# Patient Record
Sex: MALE | Race: WHITE | NOT HISPANIC OR LATINO | Employment: OTHER | URBAN - METROPOLITAN AREA
[De-identification: names, ages, dates, MRNs, and addresses within clinical notes are randomized per-mention and may not be internally consistent; named-entity substitution may affect disease eponyms.]

---

## 2017-10-10 ENCOUNTER — HISTORICAL (OUTPATIENT)
Dept: ADMINISTRATIVE | Facility: HOSPITAL | Age: 67
End: 2017-10-10

## 2017-10-17 ENCOUNTER — HISTORICAL (OUTPATIENT)
Dept: LAB | Facility: HOSPITAL | Age: 67
End: 2017-10-17

## 2017-10-19 LAB — FINAL CULTURE: NORMAL

## 2017-11-22 ENCOUNTER — HISTORICAL (OUTPATIENT)
Dept: PHYSICAL THERAPY | Facility: HOSPITAL | Age: 67
End: 2017-11-22

## 2017-11-27 ENCOUNTER — HISTORICAL (OUTPATIENT)
Dept: INFUSION THERAPY | Facility: HOSPITAL | Age: 67
End: 2017-11-27

## 2017-11-29 ENCOUNTER — HISTORICAL (OUTPATIENT)
Dept: PHYSICAL THERAPY | Facility: HOSPITAL | Age: 67
End: 2017-11-29

## 2017-11-30 ENCOUNTER — HISTORICAL (OUTPATIENT)
Dept: ADMINISTRATIVE | Facility: HOSPITAL | Age: 67
End: 2017-11-30

## 2017-12-01 ENCOUNTER — HISTORICAL (OUTPATIENT)
Dept: PHYSICAL THERAPY | Facility: HOSPITAL | Age: 67
End: 2017-12-01

## 2017-12-04 ENCOUNTER — HISTORICAL (OUTPATIENT)
Dept: PHYSICAL THERAPY | Facility: HOSPITAL | Age: 67
End: 2017-12-04

## 2017-12-06 ENCOUNTER — HISTORICAL (OUTPATIENT)
Dept: PHYSICAL THERAPY | Facility: HOSPITAL | Age: 67
End: 2017-12-06

## 2017-12-08 ENCOUNTER — HISTORICAL (OUTPATIENT)
Dept: PHYSICAL THERAPY | Facility: HOSPITAL | Age: 67
End: 2017-12-08

## 2017-12-11 ENCOUNTER — HISTORICAL (OUTPATIENT)
Dept: PHYSICAL THERAPY | Facility: HOSPITAL | Age: 67
End: 2017-12-11

## 2017-12-13 ENCOUNTER — HISTORICAL (OUTPATIENT)
Dept: PHYSICAL THERAPY | Facility: HOSPITAL | Age: 67
End: 2017-12-13

## 2017-12-15 ENCOUNTER — HISTORICAL (OUTPATIENT)
Dept: PHYSICAL THERAPY | Facility: HOSPITAL | Age: 67
End: 2017-12-15

## 2017-12-18 ENCOUNTER — HISTORICAL (OUTPATIENT)
Dept: PHYSICAL THERAPY | Facility: HOSPITAL | Age: 67
End: 2017-12-18

## 2017-12-20 ENCOUNTER — HISTORICAL (OUTPATIENT)
Dept: PHYSICAL THERAPY | Facility: HOSPITAL | Age: 67
End: 2017-12-20

## 2018-01-09 ENCOUNTER — HISTORICAL (OUTPATIENT)
Dept: ADMINISTRATIVE | Facility: HOSPITAL | Age: 68
End: 2018-01-09

## 2018-01-19 ENCOUNTER — HISTORICAL (OUTPATIENT)
Dept: PHYSICAL THERAPY | Facility: HOSPITAL | Age: 68
End: 2018-01-19

## 2018-01-24 ENCOUNTER — HISTORICAL (OUTPATIENT)
Dept: PHYSICAL THERAPY | Facility: HOSPITAL | Age: 68
End: 2018-01-24

## 2018-01-30 ENCOUNTER — HISTORICAL (OUTPATIENT)
Dept: RADIOLOGY | Facility: HOSPITAL | Age: 68
End: 2018-01-30

## 2018-02-01 ENCOUNTER — HOSPITAL ENCOUNTER (OUTPATIENT)
Dept: MEDSURG UNIT | Facility: HOSPITAL | Age: 68
End: 2018-02-04
Attending: SURGERY | Admitting: SURGERY

## 2018-02-04 LAB — FINAL CULTURE: NORMAL

## 2018-02-05 ENCOUNTER — HISTORICAL (OUTPATIENT)
Dept: SURGERY | Facility: HOSPITAL | Age: 68
End: 2018-02-05

## 2018-02-09 ENCOUNTER — HISTORICAL (OUTPATIENT)
Dept: ANESTHESIOLOGY | Facility: HOSPITAL | Age: 68
End: 2018-02-09

## 2018-02-21 ENCOUNTER — HISTORICAL (OUTPATIENT)
Dept: LAB | Facility: HOSPITAL | Age: 68
End: 2018-02-21

## 2018-02-23 LAB — FINAL CULTURE: NORMAL

## 2018-03-07 ENCOUNTER — HISTORICAL (OUTPATIENT)
Dept: PHYSICAL THERAPY | Facility: HOSPITAL | Age: 68
End: 2018-03-07

## 2018-03-09 ENCOUNTER — HISTORICAL (OUTPATIENT)
Dept: PHYSICAL THERAPY | Facility: HOSPITAL | Age: 68
End: 2018-03-09

## 2018-03-13 ENCOUNTER — HISTORICAL (OUTPATIENT)
Dept: ADMINISTRATIVE | Facility: HOSPITAL | Age: 68
End: 2018-03-13

## 2018-03-14 ENCOUNTER — HISTORICAL (OUTPATIENT)
Dept: PHYSICAL THERAPY | Facility: HOSPITAL | Age: 68
End: 2018-03-14

## 2018-03-16 ENCOUNTER — HISTORICAL (OUTPATIENT)
Dept: PHYSICAL THERAPY | Facility: HOSPITAL | Age: 68
End: 2018-03-16

## 2018-03-19 ENCOUNTER — HISTORICAL (OUTPATIENT)
Dept: PHYSICAL THERAPY | Facility: HOSPITAL | Age: 68
End: 2018-03-19

## 2018-03-21 ENCOUNTER — HISTORICAL (OUTPATIENT)
Dept: PHYSICAL THERAPY | Facility: HOSPITAL | Age: 68
End: 2018-03-21

## 2018-03-23 ENCOUNTER — HISTORICAL (OUTPATIENT)
Dept: PHYSICAL THERAPY | Facility: HOSPITAL | Age: 68
End: 2018-03-23

## 2018-03-26 ENCOUNTER — HISTORICAL (OUTPATIENT)
Dept: PHYSICAL THERAPY | Facility: HOSPITAL | Age: 68
End: 2018-03-26

## 2018-03-27 ENCOUNTER — HISTORICAL (OUTPATIENT)
Dept: PHYSICAL THERAPY | Facility: HOSPITAL | Age: 68
End: 2018-03-27

## 2018-03-29 ENCOUNTER — HISTORICAL (OUTPATIENT)
Dept: PHYSICAL THERAPY | Facility: HOSPITAL | Age: 68
End: 2018-03-29

## 2018-04-02 ENCOUNTER — HISTORICAL (OUTPATIENT)
Dept: PHYSICAL THERAPY | Facility: HOSPITAL | Age: 68
End: 2018-04-02

## 2018-04-04 ENCOUNTER — HISTORICAL (OUTPATIENT)
Dept: PHYSICAL THERAPY | Facility: HOSPITAL | Age: 68
End: 2018-04-04

## 2018-04-06 ENCOUNTER — HISTORICAL (OUTPATIENT)
Dept: LAB | Facility: HOSPITAL | Age: 68
End: 2018-04-06

## 2018-04-13 ENCOUNTER — HISTORICAL (OUTPATIENT)
Dept: LAB | Facility: HOSPITAL | Age: 68
End: 2018-04-13

## 2018-05-02 ENCOUNTER — HISTORICAL (OUTPATIENT)
Dept: PHYSICAL THERAPY | Facility: HOSPITAL | Age: 68
End: 2018-05-02

## 2018-05-04 ENCOUNTER — HISTORICAL (OUTPATIENT)
Dept: PHYSICAL THERAPY | Facility: HOSPITAL | Age: 68
End: 2018-05-04

## 2018-05-07 ENCOUNTER — HISTORICAL (OUTPATIENT)
Dept: PHYSICAL THERAPY | Facility: HOSPITAL | Age: 68
End: 2018-05-07

## 2018-05-09 ENCOUNTER — HISTORICAL (OUTPATIENT)
Dept: PHYSICAL THERAPY | Facility: HOSPITAL | Age: 68
End: 2018-05-09

## 2018-05-11 ENCOUNTER — HISTORICAL (OUTPATIENT)
Dept: PHYSICAL THERAPY | Facility: HOSPITAL | Age: 68
End: 2018-05-11

## 2018-05-14 ENCOUNTER — HISTORICAL (OUTPATIENT)
Dept: PHYSICAL THERAPY | Facility: HOSPITAL | Age: 68
End: 2018-05-14

## 2018-05-15 ENCOUNTER — HISTORICAL (OUTPATIENT)
Dept: PHYSICAL THERAPY | Facility: HOSPITAL | Age: 68
End: 2018-05-15

## 2018-05-16 ENCOUNTER — HISTORICAL (OUTPATIENT)
Dept: PHYSICAL THERAPY | Facility: HOSPITAL | Age: 68
End: 2018-05-16

## 2018-08-09 ENCOUNTER — HISTORICAL (OUTPATIENT)
Dept: LAB | Facility: HOSPITAL | Age: 68
End: 2018-08-09

## 2018-09-18 ENCOUNTER — HISTORICAL (OUTPATIENT)
Dept: ADMINISTRATIVE | Facility: HOSPITAL | Age: 68
End: 2018-09-18

## 2018-09-24 ENCOUNTER — HISTORICAL (OUTPATIENT)
Dept: LAB | Facility: HOSPITAL | Age: 68
End: 2018-09-24

## 2018-09-26 ENCOUNTER — HISTORICAL (OUTPATIENT)
Dept: LAB | Facility: HOSPITAL | Age: 68
End: 2018-09-26

## 2018-09-27 ENCOUNTER — HISTORICAL (OUTPATIENT)
Dept: CARDIOLOGY | Facility: HOSPITAL | Age: 68
End: 2018-09-27

## 2018-10-26 ENCOUNTER — HISTORICAL (OUTPATIENT)
Dept: ADMINISTRATIVE | Facility: HOSPITAL | Age: 68
End: 2018-10-26

## 2018-10-26 LAB
ABS NEUT (OLG): 10.51 X10(3)/MCL (ref 2.1–9.2)
BASOPHILS # BLD AUTO: 0 X10(3)/MCL (ref 0–0.2)
BASOPHILS NFR BLD AUTO: 0 %
BUN SERPL-MCNC: 11.6 MG/DL (ref 7–18)
CALCIUM SERPL-MCNC: 8.2 MG/DL (ref 8.5–10.1)
CHLORIDE SERPL-SCNC: 102 MMOL/L (ref 98–107)
CO2 SERPL-SCNC: 27.2 MMOL/L (ref 21–32)
CREAT SERPL-MCNC: 0.94 MG/DL (ref 0.6–1.3)
CREAT/UREA NIT SERPL: 12
EOSINOPHIL # BLD AUTO: 0 X10(3)/MCL (ref 0–0.9)
EOSINOPHIL NFR BLD AUTO: 0 %
ERYTHROCYTE [DISTWIDTH] IN BLOOD BY AUTOMATED COUNT: 14.1 % (ref 11.5–17)
FOLATE SERPL-MCNC: 9.3 NG/ML (ref 8.6–58.9)
GLUCOSE SERPL-MCNC: 135 MG/DL (ref 74–106)
HCT VFR BLD AUTO: 27.4 % (ref 42–52)
HGB BLD-MCNC: 8.9 GM/DL (ref 14–18)
IMM GRANULOCYTES # BLD AUTO: 0.05 % (ref 0–0.02)
IMM GRANULOCYTES NFR BLD AUTO: 0.4 % (ref 0–0.43)
IRON SATN MFR SERPL: 6.5 % (ref 20–50)
IRON SERPL-MCNC: 12 MCG/DL (ref 50–175)
LYMPHOCYTES # BLD AUTO: 1.7 X10(3)/MCL (ref 0.6–4.6)
LYMPHOCYTES NFR BLD AUTO: 12 %
MAGNESIUM SERPL-MCNC: 1.8 MG/DL (ref 1.8–2.4)
MCH RBC QN AUTO: 31 PG (ref 27–31)
MCHC RBC AUTO-ENTMCNC: 32.5 GM/DL (ref 33–36)
MCV RBC AUTO: 95.5 FL (ref 80–94)
MONOCYTES # BLD AUTO: 1.6 X10(3)/MCL (ref 0.1–1.3)
MONOCYTES NFR BLD AUTO: 11 %
NEUTROPHILS # BLD AUTO: 10.51 X10(3)/MCL (ref 1.4–7.9)
NEUTROPHILS NFR BLD AUTO: 76 %
PHOSPHATE SERPL-MCNC: 2.3 MG/DL (ref 2.5–4.9)
PLATELET # BLD AUTO: 324 X10(3)/MCL (ref 130–400)
PMV BLD AUTO: 10.6 FL (ref 9.4–12.4)
POTASSIUM SERPL-SCNC: 4 MMOL/L (ref 3.5–5.1)
RBC # BLD AUTO: 2.87 X10(6)/MCL (ref 4.7–6.1)
SODIUM SERPL-SCNC: 140 MMOL/L (ref 136–145)
TIBC SERPL-MCNC: 184 MCG/DL (ref 250–450)
TRANSFERRIN SERPL-MCNC: 150 MG/DL (ref 200–360)
VIT B12 SERPL-MCNC: 204 PG/ML (ref 193–986)
WBC # SPEC AUTO: 13.9 X10(3)/MCL (ref 4.5–11.5)

## 2018-10-29 LAB
ABS NEUT (OLG): 7.31 X10(3)/MCL (ref 2.1–9.2)
BASOPHILS # BLD AUTO: 0 X10(3)/MCL (ref 0–0.2)
BASOPHILS NFR BLD AUTO: 0 %
BUN SERPL-MCNC: 10.4 MG/DL (ref 7–18)
CALCIUM SERPL-MCNC: 8.2 MG/DL (ref 8.5–10.1)
CHLORIDE SERPL-SCNC: 100 MMOL/L (ref 98–107)
CO2 SERPL-SCNC: 31.9 MMOL/L (ref 21–32)
CREAT SERPL-MCNC: 0.91 MG/DL (ref 0.6–1.3)
CREAT/UREA NIT SERPL: 11
EOSINOPHIL # BLD AUTO: 0.2 X10(3)/MCL (ref 0–0.9)
EOSINOPHIL NFR BLD AUTO: 2 %
ERYTHROCYTE [DISTWIDTH] IN BLOOD BY AUTOMATED COUNT: 14.3 % (ref 11.5–17)
GLUCOSE SERPL-MCNC: 123 MG/DL (ref 74–106)
HCT VFR BLD AUTO: 29.7 % (ref 42–52)
HGB BLD-MCNC: 9.4 GM/DL (ref 14–18)
IMM GRANULOCYTES # BLD AUTO: 0.11 % (ref 0–0.02)
IMM GRANULOCYTES NFR BLD AUTO: 1 % (ref 0–0.43)
INR PPP: 3.1 (ref 2–3)
LYMPHOCYTES # BLD AUTO: 2 X10(3)/MCL (ref 0.6–4.6)
LYMPHOCYTES NFR BLD AUTO: 18 %
MAGNESIUM SERPL-MCNC: 2 MG/DL (ref 1.8–2.4)
MCH RBC QN AUTO: 30.2 PG (ref 27–31)
MCHC RBC AUTO-ENTMCNC: 31.6 GM/DL (ref 33–36)
MCV RBC AUTO: 95.5 FL (ref 80–94)
MONOCYTES # BLD AUTO: 1.4 X10(3)/MCL (ref 0.1–1.3)
MONOCYTES NFR BLD AUTO: 13 %
NEUTROPHILS # BLD AUTO: 7.31 X10(3)/MCL (ref 1.4–7.9)
NEUTROPHILS NFR BLD AUTO: 66 %
PHOSPHATE SERPL-MCNC: 4 MG/DL (ref 2.5–4.9)
PLATELET # BLD AUTO: 541 X10(3)/MCL (ref 130–400)
PMV BLD AUTO: 9.8 FL (ref 9.4–12.4)
POTASSIUM SERPL-SCNC: 4.7 MMOL/L (ref 3.5–5.1)
PROTHROMBIN TIME: 29.6 SECOND(S) (ref 9.3–11.4)
RBC # BLD AUTO: 3.11 X10(6)/MCL (ref 4.7–6.1)
SODIUM SERPL-SCNC: 137 MMOL/L (ref 136–145)
WBC # SPEC AUTO: 11.1 X10(3)/MCL (ref 4.5–11.5)

## 2018-10-30 LAB
INR PPP: 2.68 (ref 2–3)
PROTHROMBIN TIME: 25.9 SECOND(S) (ref 9.3–11.4)

## 2018-11-01 LAB
INR PPP: 3.61 (ref 2–3)
PROTHROMBIN TIME: 34.1 SECOND(S) (ref 9.3–11.4)

## 2018-11-02 LAB
INR PPP: 3.42 (ref 2–3)
PROTHROMBIN TIME: 32.4 SECOND(S) (ref 9.3–11.4)

## 2018-11-03 LAB
INR PPP: 2.18 (ref 2–3)
PROTHROMBIN TIME: 21.4 SECOND(S) (ref 9.3–11.4)

## 2018-11-05 LAB
INR PPP: 1.82 (ref 2–3)
PROTHROMBIN TIME: 18.2 SECOND(S) (ref 9.3–11.4)

## 2018-11-06 ENCOUNTER — HISTORICAL (OUTPATIENT)
Dept: ADMINISTRATIVE | Facility: HOSPITAL | Age: 68
End: 2018-11-06

## 2018-11-06 LAB
INR PPP: 2.32 (ref 2–3)
PROTHROMBIN TIME: 22.7 SECOND(S) (ref 9.3–11.4)

## 2018-11-07 LAB
INR PPP: 3.49 (ref 2–3)
PROTHROMBIN TIME: 33.1 SECOND(S) (ref 9.3–11.4)

## 2018-11-15 ENCOUNTER — HISTORICAL (OUTPATIENT)
Dept: PHYSICAL THERAPY | Facility: HOSPITAL | Age: 68
End: 2018-11-15

## 2018-11-16 ENCOUNTER — HISTORICAL (OUTPATIENT)
Dept: PHYSICAL THERAPY | Facility: HOSPITAL | Age: 68
End: 2018-11-16

## 2018-11-19 ENCOUNTER — HISTORICAL (OUTPATIENT)
Dept: PHYSICAL THERAPY | Facility: HOSPITAL | Age: 68
End: 2018-11-19

## 2018-11-20 ENCOUNTER — HISTORICAL (OUTPATIENT)
Dept: PHYSICAL THERAPY | Facility: HOSPITAL | Age: 68
End: 2018-11-20

## 2018-11-27 ENCOUNTER — HISTORICAL (OUTPATIENT)
Dept: PHYSICAL THERAPY | Facility: HOSPITAL | Age: 68
End: 2018-11-27

## 2018-11-29 ENCOUNTER — HISTORICAL (OUTPATIENT)
Dept: PHYSICAL THERAPY | Facility: HOSPITAL | Age: 68
End: 2018-11-29

## 2018-12-04 ENCOUNTER — HISTORICAL (OUTPATIENT)
Dept: PHYSICAL THERAPY | Facility: HOSPITAL | Age: 68
End: 2018-12-04

## 2018-12-07 ENCOUNTER — HISTORICAL (OUTPATIENT)
Dept: PHYSICAL THERAPY | Facility: HOSPITAL | Age: 68
End: 2018-12-07

## 2018-12-10 ENCOUNTER — HISTORICAL (OUTPATIENT)
Dept: PHYSICAL THERAPY | Facility: HOSPITAL | Age: 68
End: 2018-12-10

## 2018-12-12 ENCOUNTER — HISTORICAL (OUTPATIENT)
Dept: PHYSICAL THERAPY | Facility: HOSPITAL | Age: 68
End: 2018-12-12

## 2018-12-17 ENCOUNTER — HISTORICAL (OUTPATIENT)
Dept: PHYSICAL THERAPY | Facility: HOSPITAL | Age: 68
End: 2018-12-17

## 2018-12-19 ENCOUNTER — HISTORICAL (OUTPATIENT)
Dept: PHYSICAL THERAPY | Facility: HOSPITAL | Age: 68
End: 2018-12-19

## 2018-12-26 ENCOUNTER — HISTORICAL (OUTPATIENT)
Dept: PHYSICAL THERAPY | Facility: HOSPITAL | Age: 68
End: 2018-12-26

## 2019-01-09 ENCOUNTER — HISTORICAL (OUTPATIENT)
Dept: PHYSICAL THERAPY | Facility: HOSPITAL | Age: 69
End: 2019-01-09

## 2019-01-15 ENCOUNTER — HISTORICAL (OUTPATIENT)
Dept: PHYSICAL THERAPY | Facility: HOSPITAL | Age: 69
End: 2019-01-15

## 2019-01-17 ENCOUNTER — HISTORICAL (OUTPATIENT)
Dept: PHYSICAL THERAPY | Facility: HOSPITAL | Age: 69
End: 2019-01-17

## 2019-01-22 ENCOUNTER — HISTORICAL (OUTPATIENT)
Dept: PHYSICAL THERAPY | Facility: HOSPITAL | Age: 69
End: 2019-01-22

## 2019-01-25 ENCOUNTER — HISTORICAL (OUTPATIENT)
Dept: PHYSICAL THERAPY | Facility: HOSPITAL | Age: 69
End: 2019-01-25

## 2019-01-29 ENCOUNTER — HISTORICAL (OUTPATIENT)
Dept: PHYSICAL THERAPY | Facility: HOSPITAL | Age: 69
End: 2019-01-29

## 2019-02-01 ENCOUNTER — HISTORICAL (OUTPATIENT)
Dept: PHYSICAL THERAPY | Facility: HOSPITAL | Age: 69
End: 2019-02-01

## 2019-02-05 ENCOUNTER — HISTORICAL (OUTPATIENT)
Dept: PHYSICAL THERAPY | Facility: HOSPITAL | Age: 69
End: 2019-02-05

## 2019-02-05 ENCOUNTER — HISTORICAL (OUTPATIENT)
Dept: ADMINISTRATIVE | Facility: HOSPITAL | Age: 69
End: 2019-02-05

## 2019-02-19 ENCOUNTER — HISTORICAL (OUTPATIENT)
Dept: ADMINISTRATIVE | Facility: HOSPITAL | Age: 69
End: 2019-02-19

## 2019-03-07 ENCOUNTER — HISTORICAL (OUTPATIENT)
Dept: ADMINISTRATIVE | Facility: HOSPITAL | Age: 69
End: 2019-03-07

## 2019-04-11 ENCOUNTER — HISTORICAL (OUTPATIENT)
Dept: LAB | Facility: HOSPITAL | Age: 69
End: 2019-04-11

## 2019-04-25 ENCOUNTER — HISTORICAL (OUTPATIENT)
Dept: ADMINISTRATIVE | Facility: HOSPITAL | Age: 69
End: 2019-04-25

## 2019-05-30 ENCOUNTER — HISTORICAL (OUTPATIENT)
Dept: ADMINISTRATIVE | Facility: HOSPITAL | Age: 69
End: 2019-05-30

## 2019-06-27 ENCOUNTER — HISTORICAL (OUTPATIENT)
Dept: ADMINISTRATIVE | Facility: HOSPITAL | Age: 69
End: 2019-06-27

## 2019-08-01 ENCOUNTER — HISTORICAL (OUTPATIENT)
Dept: ADMINISTRATIVE | Facility: HOSPITAL | Age: 69
End: 2019-08-01

## 2019-08-22 ENCOUNTER — HISTORICAL (OUTPATIENT)
Dept: ADMINISTRATIVE | Facility: HOSPITAL | Age: 69
End: 2019-08-22

## 2019-09-19 ENCOUNTER — HISTORICAL (OUTPATIENT)
Dept: ADMINISTRATIVE | Facility: HOSPITAL | Age: 69
End: 2019-09-19

## 2019-10-17 ENCOUNTER — HISTORICAL (OUTPATIENT)
Dept: ADMINISTRATIVE | Facility: HOSPITAL | Age: 69
End: 2019-10-17

## 2019-11-14 ENCOUNTER — HISTORICAL (OUTPATIENT)
Dept: ADMINISTRATIVE | Facility: HOSPITAL | Age: 69
End: 2019-11-14

## 2019-12-12 ENCOUNTER — HISTORICAL (OUTPATIENT)
Dept: ADMINISTRATIVE | Facility: HOSPITAL | Age: 69
End: 2019-12-12

## 2019-12-12 LAB
INR PPP: 2.1
PROTHROMBIN TIME: 25.7 S

## 2020-01-16 ENCOUNTER — HISTORICAL (OUTPATIENT)
Dept: ADMINISTRATIVE | Facility: HOSPITAL | Age: 70
End: 2020-01-16

## 2020-02-13 ENCOUNTER — HISTORICAL (OUTPATIENT)
Dept: ADMINISTRATIVE | Facility: HOSPITAL | Age: 70
End: 2020-02-13

## 2020-03-24 ENCOUNTER — HISTORICAL (OUTPATIENT)
Dept: ADMINISTRATIVE | Facility: HOSPITAL | Age: 70
End: 2020-03-24

## 2020-04-17 ENCOUNTER — HISTORICAL (OUTPATIENT)
Dept: LAB | Facility: HOSPITAL | Age: 70
End: 2020-04-17

## 2020-04-21 ENCOUNTER — HISTORICAL (OUTPATIENT)
Dept: ADMINISTRATIVE | Facility: HOSPITAL | Age: 70
End: 2020-04-21

## 2020-06-05 ENCOUNTER — HISTORICAL (OUTPATIENT)
Dept: LAB | Facility: HOSPITAL | Age: 70
End: 2020-06-05

## 2020-06-16 ENCOUNTER — HISTORICAL (OUTPATIENT)
Dept: ADMINISTRATIVE | Facility: HOSPITAL | Age: 70
End: 2020-06-16

## 2020-07-14 ENCOUNTER — HISTORICAL (OUTPATIENT)
Dept: ADMINISTRATIVE | Facility: HOSPITAL | Age: 70
End: 2020-07-14

## 2020-08-11 ENCOUNTER — HISTORICAL (OUTPATIENT)
Dept: ADMINISTRATIVE | Facility: HOSPITAL | Age: 70
End: 2020-08-11

## 2020-09-08 ENCOUNTER — HISTORICAL (OUTPATIENT)
Dept: ADMINISTRATIVE | Facility: HOSPITAL | Age: 70
End: 2020-09-08

## 2020-11-19 ENCOUNTER — HISTORICAL (OUTPATIENT)
Dept: ADMINISTRATIVE | Facility: HOSPITAL | Age: 70
End: 2020-11-19

## 2020-12-14 ENCOUNTER — HISTORICAL (OUTPATIENT)
Dept: LAB | Facility: HOSPITAL | Age: 70
End: 2020-12-14

## 2020-12-17 ENCOUNTER — HISTORICAL (OUTPATIENT)
Dept: ADMINISTRATIVE | Facility: HOSPITAL | Age: 70
End: 2020-12-17

## 2020-12-17 LAB — FINAL CULTURE: NORMAL

## 2021-01-07 ENCOUNTER — HISTORICAL (OUTPATIENT)
Dept: ADMINISTRATIVE | Facility: HOSPITAL | Age: 71
End: 2021-01-07

## 2021-01-28 ENCOUNTER — HISTORICAL (OUTPATIENT)
Dept: ADMINISTRATIVE | Facility: HOSPITAL | Age: 71
End: 2021-01-28

## 2021-03-11 ENCOUNTER — HISTORICAL (OUTPATIENT)
Dept: ADMINISTRATIVE | Facility: HOSPITAL | Age: 71
End: 2021-03-11

## 2021-03-25 ENCOUNTER — HISTORICAL (OUTPATIENT)
Dept: ADMINISTRATIVE | Facility: HOSPITAL | Age: 71
End: 2021-03-25

## 2021-04-27 ENCOUNTER — HISTORICAL (OUTPATIENT)
Dept: ADMINISTRATIVE | Facility: HOSPITAL | Age: 71
End: 2021-04-27

## 2021-05-06 ENCOUNTER — HISTORICAL (OUTPATIENT)
Dept: LAB | Facility: HOSPITAL | Age: 71
End: 2021-05-06

## 2021-06-01 ENCOUNTER — HISTORICAL (OUTPATIENT)
Dept: ADMINISTRATIVE | Facility: HOSPITAL | Age: 71
End: 2021-06-01

## 2021-06-22 ENCOUNTER — HISTORICAL (OUTPATIENT)
Dept: ADMINISTRATIVE | Facility: HOSPITAL | Age: 71
End: 2021-06-22

## 2021-07-20 ENCOUNTER — HISTORICAL (OUTPATIENT)
Dept: ADMINISTRATIVE | Facility: HOSPITAL | Age: 71
End: 2021-07-20

## 2021-09-09 ENCOUNTER — HISTORICAL (OUTPATIENT)
Dept: ADMINISTRATIVE | Facility: HOSPITAL | Age: 71
End: 2021-09-09

## 2021-09-23 ENCOUNTER — HISTORICAL (OUTPATIENT)
Dept: ADMINISTRATIVE | Facility: HOSPITAL | Age: 71
End: 2021-09-23

## 2021-10-12 ENCOUNTER — HISTORICAL (OUTPATIENT)
Dept: ADMINISTRATIVE | Facility: HOSPITAL | Age: 71
End: 2021-10-12

## 2021-11-04 ENCOUNTER — HISTORICAL (OUTPATIENT)
Dept: ADMINISTRATIVE | Facility: HOSPITAL | Age: 71
End: 2021-11-04

## 2021-11-15 ENCOUNTER — HISTORICAL (OUTPATIENT)
Dept: RADIOLOGY | Facility: HOSPITAL | Age: 71
End: 2021-11-15

## 2021-12-09 ENCOUNTER — HISTORICAL (OUTPATIENT)
Dept: ADMINISTRATIVE | Facility: HOSPITAL | Age: 71
End: 2021-12-09

## 2021-12-30 ENCOUNTER — HISTORICAL (OUTPATIENT)
Dept: ADMINISTRATIVE | Facility: HOSPITAL | Age: 71
End: 2021-12-30

## 2022-01-27 ENCOUNTER — HISTORICAL (OUTPATIENT)
Dept: ADMINISTRATIVE | Facility: HOSPITAL | Age: 72
End: 2022-01-27

## 2022-03-08 ENCOUNTER — HISTORICAL (OUTPATIENT)
Dept: ADMINISTRATIVE | Facility: HOSPITAL | Age: 72
End: 2022-03-08

## 2022-03-22 ENCOUNTER — HISTORICAL (OUTPATIENT)
Dept: ADMINISTRATIVE | Facility: HOSPITAL | Age: 72
End: 2022-03-22

## 2022-04-10 ENCOUNTER — HISTORICAL (OUTPATIENT)
Dept: ADMINISTRATIVE | Facility: HOSPITAL | Age: 72
End: 2022-04-10
Payer: MEDICAID

## 2022-04-12 ENCOUNTER — HISTORICAL (OUTPATIENT)
Dept: ADMINISTRATIVE | Facility: HOSPITAL | Age: 72
End: 2022-04-12

## 2022-04-29 VITALS
SYSTOLIC BLOOD PRESSURE: 131 MMHG | HEIGHT: 67 IN | WEIGHT: 224.88 LBS | BODY MASS INDEX: 35.29 KG/M2 | BODY MASS INDEX: 33.08 KG/M2 | HEIGHT: 67 IN | WEIGHT: 229.25 LBS | BODY MASS INDEX: 35.98 KG/M2 | HEIGHT: 67 IN | SYSTOLIC BLOOD PRESSURE: 138 MMHG | BODY MASS INDEX: 33.08 KG/M2 | WEIGHT: 231.94 LBS | SYSTOLIC BLOOD PRESSURE: 115 MMHG | HEIGHT: 67 IN | DIASTOLIC BLOOD PRESSURE: 69 MMHG | HEIGHT: 67 IN | BODY MASS INDEX: 33.08 KG/M2 | DIASTOLIC BLOOD PRESSURE: 87 MMHG | BODY MASS INDEX: 36.4 KG/M2 | HEIGHT: 67 IN | WEIGHT: 231.94 LBS | BODY MASS INDEX: 36.4 KG/M2 | SYSTOLIC BLOOD PRESSURE: 106 MMHG | DIASTOLIC BLOOD PRESSURE: 56 MMHG | HEIGHT: 67 IN | SYSTOLIC BLOOD PRESSURE: 145 MMHG | WEIGHT: 210.75 LBS | WEIGHT: 210.75 LBS | SYSTOLIC BLOOD PRESSURE: 108 MMHG | WEIGHT: 210.75 LBS | DIASTOLIC BLOOD PRESSURE: 77 MMHG | DIASTOLIC BLOOD PRESSURE: 71 MMHG | DIASTOLIC BLOOD PRESSURE: 62 MMHG

## 2022-04-30 NOTE — DISCHARGE SUMMARY
Patient:   Damon Adkins            MRN: [id number]            FIN: [id number]               Age:   68 years     Sex:  Male     :  1950   Associated Diagnoses:   Anxiety; Atrial fibrillation; Coronary artery disease; HLD (hyperlipidemia)   Author:   Rebel Laguerre MD, Robert N      Results Review   General results   Most recent results      Discharge Information   Discharge Summary Information:  Admitted  10/25/2018.         Admitting physician: Rebel Laguerre MD, Robert N.         Discharge diagnosis: Anxiety (JVZ71-IY F41.9), Atrial fibrillation (PQB71-VE I48.91), Coronary artery disease (DVF60-PY I25.10), HLD (hyperlipidemia) (WSW88-MV E78.5).         Discharge medications: OTHER MEDICATIONS (Selected)   Prescriptions  Prescribed  Ambien 5 mg oral tablet: 5 mg = 1 tab(s), Oral, At Bedtime, PRN PRN insomnia, X 5 day(s), # 5 tab(s), 0 Refill(s)  Paxil 20 mg oral tablet: 20 mg = 1 tab(s), Oral, Daily, # 30 tab(s), 5 Refill(s), Pharmacy: [facility]  acetaminophen-hydrocodone 325 mg-5 mg oral tablet: 1 tab(s), Oral, q4hr, PRN PRN pain, moderate, X 3 day(s), # 20 tab(s), 0 Refill(s)  metoprolol tartrate 25 mg oral tab: 12.5 mg = 0.5 tab(s), Oral, BID, # 30 tab(s), 6 Refill(s), Pharmacy: [facility]  nitroglycerin 0.4 mg sublingual TAB: 0.4 mg = 1 tab(s), SL, q5min, PRN PRN for chest pain, # 8 tab(s), 0 Refill(s), Pharmacy: [facility]  warfarin 2.5 mg oral tablet: 2.5 mg = 1 tab(s), Oral, Daily, # 30 tab(s), 0 Refill(s)  Documented Medications  Documented  amitriptyline 25 mg oral tablet: 25 mg = 1 tab(s), Oral, Once a day (at bedtime), # 60 tab(s), 0 Refill(s)  aspirin 81 mg oral Delayed Release (EC) tablet: 81 mg = 1 tab(s), Oral, At Bedtime  atorvastatin 40 mg oral tablet: 40 mg = 1 tab(s), Oral, At Bedtime, # 30 tab(s), 0 Refill(s).       Physical Examination      Vital Signs (last 24 hrs)_____  Last Charted___________  Temp Oral     36.7 DegC  ( 07:50)  Heart Rate  Peripheral   100 bpm  (NOV 07 07:50)  Resp Rate         18 br/min  (NOV 06 19:00)  SBP      112 mmHg  (NOV 07 07:50)  DBP      72 mmHg  (NOV 07 07:50)  SpO2      99 %  (NOV 07 07:50)  Weight      104.1 kg  (NOV 07 06:00)   Measurements from flowsheet : Measurements   11/7/2018 6:00 CST       Weight Measured           104.1 kg                             Weighing Method           Standing    11/6/2018 12:29 CST      Weight Dosing             102 kg                             Weight Measured           102 kg                             Weight Measured and Calculated in Lbs     224.87 lb                             Height/Length Dosing      170 cm                             Height/Length Measured    170 cm                             BSA Measured              2.19 m2                             Body Mass Index Measured  35.29 kg/m2    11/6/2018 6:00 CST       Weight Measured           102.8 kg                             Weighing Method           Standing     General:  Alert and oriented, No acute distress.    Eye:  Extraocular movements are intact, Normal conjunctiva.    HENT:  Normocephalic.    Respiratory:  Lungs are clear to auscultation, Respirations are non-labored, Breath sounds are equal.    Cardiovascular:  Regular rhythm, No murmur.       Hospital Course   Hospital Course   Mr. Adkins is a 68-year-old male with a history of bilateral knee osteoarthritis.  He underwent bilateral total knee arthroplasties on 22 October.  His postoperative course has thus far been unremarkable.  Based on evaluation by physical therapy at [facility] he was recommended for further rehabilitation prior to returning home.      His course at [facility] was good.  He worked well with physical and occupational therapy.  His endurance and strength improved throughout.  He had achieved his therapy goals and was cleared by physical therapy.    He was discharged to home in stable condition.  Arrangements for outpatient physical  therapy were made prior to his discharge.      41 Minutes to review/coordinate care,  the patient and prepare discharge.           Discharge Plan   Discharge Summary Plan   Discharge Status: improved.     Discharge instructions given: to patient, to family member.     Discharge disposition: discharge to home self care.     Prescriptions: continue same medications, written and given to patient.     Orders     Orders (Selected)   Inpatient Orders  Ordered  Discharge Activity: Activity as Tolerated  Discharge Diet: Cardiac.        Education and Follow-up   Discharge Planning: What You Need to Know About Warfarin, Fall Prevention and Home Safety, Easy-to-Read (Custom), Fall Prevention in Hospitals, Adult, Coronary Artery Disease, Male, Constipation, Adult, Atrial Fibrillation, Osteoarthritis, Hypertension, Sleep Apnea, Easy-to-Read, Pain Medicine Instructions, Deconditioning, Total Knee Replacement - Alternate Blood Thinner (STEPHANIE) 09/12/17 (TLBOOTHE) (Custom), Angel Stephanie 11/6/2018 11:00:00; Iva Skaggs NP Within 1 week Attempted to call for appt x4, left message for call back on voice mail; [facility] will call patient with follow up appointment phone # [phone]; NITESH Lindsay will deliver Walker before discharge phone # [phone]; .        Quality Measures

## 2022-04-30 NOTE — H&P
Patient:   Damon Adkins            MRN: [id number]            FIN: [id number]               Age:   68 years     Sex:  Male     :  1950   Associated Diagnoses:   None   Author:   Rebel Laguerre MD, Russ DYE      Basic Information   Source of history:  Self, Significant other.    Present at bedside:  Significant other.    Referral source:  Stephanie NARVAEZ, Alexandro CORTES    History limitation:  None.       Chief Complaint   Debility following bilateral TKA      History of Present Illness   Mr. Adkins is a 68-year-old male with a history of bilateral knee osteoarthritis.  He underwent bilateral total knee arthroplasties on .  His postoperative course has thus far been unremarkable.  Based on evaluation by physical therapy at [facility] he was recommended for further rehabilitation prior to returning home.  He was accepted as a transfer into rehabilitation bed.      Review of Systems   Constitutional:  Negative.    Eye:  Negative.    Ear/Nose/Mouth/Throat:  Negative.    Respiratory:  Negative.    Cardiovascular:  Negative.    Gastrointestinal:  Negative.    Genitourinary:  Negative.    Hematology/Lymphatics:  Negative.    Endocrine:  Negative.    Immunologic:  Negative.    Musculoskeletal:  Negative.    Integumentary:  Negative.    Neurologic:  Negative.    Psychiatric:  Negative.       Health Status   Allergies:    Allergic Reactions (Selected)  No Known Allergies   Current medications:  (Selected)   Prescriptions  Prescribed  Paxil 20 mg oral tablet: 20 mg = 1 tab(s), Oral, Daily, # 30 tab(s), 5 Refill(s), Pharmacy: [facility]  Ultram 50 mg oral tablet: 50 mg = 1 tab(s), Oral, q4hr, PRN PRN breakthru pain, X 7 day(s), # 42 tab(s), 0 Refill(s), other reason (Rx)  metoprolol tartrate 25 mg oral tab: 12.5 mg = 0.5 tab(s), Oral, BID, # 30 tab(s), 6 Refill(s), Pharmacy: [facility]  nitroglycerin 0.4 mg sublingual TAB: 0.4 mg = 1 tab(s), SL, q5min, PRN PRN for chest pain, # 8  tab(s), 0 Refill(s), Pharmacy: [facility]  warfarin 2.5 mg oral tablet: 2.5 mg = 1 tab(s), Oral, At Bedtime, Dr DAYNA Vargas., # 90 tab(s), 1 Refill(s), Pharmacy: [facility]  Documented Medications  Documented  Colace 100 mg oral capsule: 100 mg = 1 cap(s), Oral, Daily, 0 Refill(s)  MiraLax (polyethylene glycol 3350): 17 gm = 1 packet(s), Oral, Daily, PRN PRN constipation, 0 Refill(s)  Robaxin 500 mg oral tablet: 750 mg = 1.5 tab(s), Oral, TID, PRN PRN spasm, 0 Refill(s)  Tylenol 325 mg oral tablet: 650 mg = 2 tab(s), Oral, q4hr, 0 Refill(s)  amitriptyline 25 mg oral tablet: 25 mg = 1 tab(s), Oral, Once a day (at bedtime), # 60 tab(s), 0 Refill(s)  aspirin 81 mg oral Delayed Release (EC) tablet: 81 mg = 1 tab(s), Oral, At Bedtime  atorvastatin 40 mg oral tablet: 40 mg = 1 tab(s), Oral, At Bedtime, # 30 tab(s), 0 Refill(s)  magnesium hydroxide: 30 mL, Oral, Daily, PRN PRN constipation, 0 Refill(s)   Problem list:    All Problems (Selected)  Anxiety / SNOMED CT 04649640 / Confirmed  Takes Paxil when having stress at work  Atrial fibrillation / SNOMED CT 96758252 / Confirmed  Coronary artery disease / SNOMED CT 2167318582 / Confirmed  HLD (hyperlipidemia) / SNOMED CT [id number] / Confirmed  Hyperlipidemia / SNOMED CT 67951662 / Confirmed  Hypertension / SNOMED CT [id number] / Confirmed  Impaired mobility / SNOMED CT 775568244 / Confirmed  Obesity / SNOMED CT 6917939042 / Confirmed  Obstructive sleep apnea of adult / SNOMED CT 0941076624 / Confirmed  Osteoarthritis / SNOMED CT [id number] / Confirmed      Histories   Past Medical History:    Active  Anxiety (24861303)  Comments:  4/24/2015 CDT 5:35 CDT - Sharifa GILLETTE, Gregory  Takes Paxil when having stress at work  Atrial fibrillation (70707297)  Coronary artery disease (7431157325)  Hyperlipidemia (75040982)  Obstructive sleep apnea of adult (0342983239)   Family History:     Primary malignant neoplasm of prostate  Father  Brain aneurysm  Mother  Acute myocardial infarction.  Brother  Father  Cardiac arrest.  Brother     Procedure history:    Total Knee Arthroplasty Bilateral (Bilateral) performed by Angel Brown MD on 10/22/2018 at 68 Years.  Comments:  10/22/2018 15:05 - Susana Chaudhari RN  auto-populated from documented surgical case  Total Shoulder Arthroplasty (Left) performed by Angel Brown MD on 2/26/2018 at 68 Years.  Comments:  2/26/2018 10:26 - Susana Chaudhari RN  auto-populated from documented surgical case  Colonoscopy performed by Ajit Gutierrez MD on 2/9/2018 at 68 Years.  Comments:  2/9/2018 14:52 - Gini Novoa RN  auto-populated from documented surgical case  Polypectomy performed by Ajit Gutierrez MD on 2/9/2018 at 68 Years.  Comments:  2/9/2018 14:52 - Gini Novoa RN  auto-populated from documented surgical case  Colon Tattoo performed by Ajit Gutierrez MD on 2/9/2018 at 68 Years.  Comments:  2/9/2018 14:52 - Gini Novoa RN  auto-populated from documented surgical case  Total Shoulder Arthroplasty (Right) performed by Angel Brown MD on 11/13/2017 at 67 Years.  Comments:  11/13/2017 12:56 - Mikey Al RN  auto-populated from documented surgical case  Cholecystectomy Laparoscopic (.) performed by Jacob Pennington MD on 9/27/2016 at 66 Years.  Comments:  9/27/2016 15:22 - Cintia Mallory RN  auto-populated from documented surgical case  Bypass CABG (.) performed by Osiel Saba MD on 4/23/2015 at 65 Years.  Comments:  4/23/2015 19:15 - Sandra Patel RN  auto-populated from documented surgical case  Intra Aortic Balloon Pump Insertion performed by Carlos Moralez MD on 4/23/2015 at 65 Years.  Comments:  4/28/2015 10:54 - Trudy Bonds  auto-populated from documented surgical case  Left Heart Cath w/COR Angio Ventriculography performed by Carlos Moralez MD on 4/23/2015 at 65 Years.  Comments:  4/28/2015 10:54 -  Trudy Bonds  auto-populated from documented surgical case  Bilateral vasectomy for contraception (SNOMED CT 006931327) in 2007 at 57 Years.   Social History        Social & Psychosocial Habits    Alcohol  01/28/2016  Use: Current    Type: Wine    Frequency: 1-2 times per year    Started at age: 22 Years    Previous treatment: None    Has alcohol use interfered with work or home life? No    Do you ever drink more than intended? No    Has anyone been hurt or at risk by your drinking? No    Ready to change: No    Concerns about alcohol use in household: No    Comment: Drinks socially when out with finoemíe - 04/24/2015 05:42 - Gregory Skaggs RN    09/27/2018  Use: Past    Comment: Quit 2016 - 09/27/2018 06:10 - Lydia Calvin RN    Employment/School  01/06/2016  Status: Unemployed    Description: construction    Previous employment/school: self employed    Activity level: Heavy physical work    Highest education: High school    Hazardous equipment operation: No    Work hazards: Heavy lifting/twisting    Exercise  06/03/2015  Times per week: Daily    Self assessment: Good condition    Exercise type: has an exercise machine    Home/Environment  01/06/2016  Lives with: Significant other    Living situation: Home/Independent    Home equipment: CPAP/BiPAP    Alcohol abuse in household: No    Substance abuse in household: No    Smoker in household: Yes    Injuries/Abuse/Neglect in household: No    Feels unsafe at home: No    Family/Friends available to help: Yes    Concern for family members at home: No    Major illness in household: No    Financial concerns: No    Concerns over TV/Computer/Game use: No    Comment: has 3 children - 06/03/2015 08:12 - Corona Maguire LPN    Nutrition/Health  01/06/2016  Diet description: cardiac diet low salt    Type of diet: low salt cardiac diet    Wants to lose weight: Yes    Sleeping concerns: Yes    Feels highly stressed: Yes    03/02/2016  Caffeine intake amount:  coffee 1-2 cups in morning    Wants to lose weight: Yes    Sleeping concerns: Yes    Feels highly stressed: No    Substance Abuse  05/19/2015 Risk Assessment: Denies Substance Abuse    02/06/2018  Use: Never    Tobacco  06/03/2015  Use: Former smoker    Type: Cigarettes    Number of years: 10    Started at age: 17.0 Years    Stopped at age: 28 Years    Concerns about tobacco use in household: No.        Physical Examination   General:  Alert and oriented, No acute distress.    Eye:  Pupils are equal, round and reactive to light, Extraocular movements are intact, Normal conjunctiva, Vision unchanged.    HENT:  Normocephalic, Normal hearing, Oral mucosa is moist.         Ear: Both ears, Tympanic membrane ( Intact ).    Neck:  Supple, No carotid bruit, No jugular venous distention.    Respiratory:  Lungs are clear to auscultation, Respirations are non-labored, Breath sounds are equal.    Cardiovascular:  Normal rate, Regular rhythm, No murmur, No gallop.         Arterial pulses: Bilateral, Upper extremity, Normal.         Arterial pulses: Bilateral, Lower extremity, Posterior tibial, Dorsalis pedis, Diminished.         Capillary refill: Bilateral, Lower extremity, 3  seconds.         Edema: Bilateral, Lower extremity, Ankle, 1+, Not pitting.    Gastrointestinal:  Soft, Non-tender, Non-distended, Normal bowel sounds.       Vital Signs (last 24 hrs)_____  Last Charted___________  SpO2      98 %  (OCT 25 17:00)   Integumentary:  Warm, Dry, No rash.    Neurologic:  Alert, Oriented, No focal deficits.    Cognition and Speech:  Oriented, Speech clear and coherent, Functional cognition intact.    Psychiatric:  Cooperative, Appropriate mood & affect, Normal judgment.       Review / Management   Results review:     No qualifying data available.       Impression and Plan   Debility: Physical and Occupational Therapy will be consulted.  We will initiate therapy after establishing his abilities and repeated goals.    Coronary  artery disease: Patient is undergone a recent cardiac cath that shows continued disease.  His coronary artery disease is currently being managed medically.  We will continue his beta-blockers per his outpatient routine.    HTN: He is stable on his home medications which we will continue.    atrial fibrillation: He is adequately rate controlled on his current medications.  Venous thromboembolic prophylaxis is Via Coumadin.    HLD: He is stable on his home medications we will continue.    JULIAN: The patient has brought his home CPAP machine with him to the hospital.  We will allow him to use it at his home settings.    We discussed advanced directives.  He wishes full resuscitation.  He declined him designate a surrogate medical decision maker.

## 2022-04-30 NOTE — CONSULTS
DATE OF CONSULTATION:      CONSULTING PHYSICIAN:  Vinay Willis M.D.    REASON FOR CONSULTATION:  Medical management of a patient who has been admitted from [facility] for abdominal pain secondary to small bowel obstruction.    HISTORY OF PRESENT ILLNESS:  The patient is a very pleasant 67-year-old  male whom Dr. Melo Gutierrez has accepted from [facility], admitted because he was having abdominal pain which was associated with mild amount of diarrhea for several days.  The pain was both lower quadrants.  He denied any fever, chills or any other complaint.  He did not have any nausea or vomiting.    PAST MEDICAL HISTORY:  Basically significant for dyslipidemia, atrial fibrillation, anxiety, coronary artery disease, status post CABG with 3 bypasses in 2015, history of sleep apnea, on CPAP machine, BPH, cholelithiasis, dyslipidemia, hypertension, osteoarthritis.    PAST SURGICAL HISTORY:  Includes CABG, cholecystectomy, intra-aortic balloon pump insertion at age of 65, left ventricular catheterization, bilateral vasectomy, total shoulder arthroplasty of the right side.    FAMILY HISTORY:  Pertinent for father having brain aneurysm, mother having myocardial infarction and cardiac arrest.  One brother had cardiac arrest.    SOCIAL HISTORY:  Patient does drink wine, not very frequently.  Denies any other substance abuse.  He is a former smoker.    ALLERGIES:  NO KNOWN DRUG ALLERGIES.      MEDICATIONS:  On which the patient is on are:  Colace 100 mg 2 tablets daily, Lipitor 40 mg daily, Paxil 20 mg daily, Percocet 5/325 one tablet p.r.n. q.6 hours, Valium 5 mg daily, metoprolol tartrate 25 mg half tablet b.i.d., warfarin 2.5 mg daily.    REVIEW OF SYSTEMS:  As mentioned in history of present illness.    PHYSICAL EXAMINATION:  GENERAL:  Patient is alert and oriented x3.  He is in no acute distress now.  He has not passed any flatulence.   VITALS:  Stable.   HEENT:  Head is  normocephalic.  Pupils bilaterally reactive, equal in size.  Mild conjunctival pallor.  No scleral icterus.     NECK:  Trachea is in midline.   CHEST:  Good bilateral air entry.   CVS:  First and second heart sounds are heard normally with soft systolic ejection murmur, and they are irregular.     ABDOMEN:  Soft, but there is diffuse tenderness in the lower abdomen.   EXTREMITIES:  Devoid of edema, cyanosis, or clubbing.    LABS AND INVESTIGATIONS:  Sodium 136, potassium 3.8, chloride 100, bicarb 28.7, calcium 9.4, glucose 114, BUN 14.4, creatinine 1.14.  TSH 4.646.  PT/INR within normal limits.  WBC 9.5, hemoglobin 14.7, hematocrit 44.7, platelet count 279.  Stool for occult blood negative.  C difficile negative.  __________ antigen negative.  CAT scan of the abdomen shows generalized __________, status post cholecystectomy, descending and sigmoid colon diverticulosis, no acute abdominal disease is found.  Small bowel follow-through shows significant small bowel ileus.  Flat and erect unremarkable.  Chest x-ray unremarkable.    IMPRESSION:    1. Small bowel obstruction.  2. History of coronary artery disease.  3. History of insomnia.  4. History of anxiety.  5. New onset hypothyroidism.    PLAN:  Continue present line of treatment.  Follow the surgical recommendations.  Synthroid is going to be started by the patient's primary care physician as the TSH is just a wee bit high.  DVT prophylaxis per the surgeon's protocol.       It is a pleasure taking care of this patient during his stay at [facility].      ALEIDA/ADAM   DD: 02/02/2018 2001   DT: 02/02/2018 2032  Job # [id number]

## 2022-04-30 NOTE — PROGRESS NOTES
Chief Complaint    B/L KNEE REPLACEMENTS ON 10/22/2018, PT IN [facility] FOR THERAPY, MAY BE GOING HOME TOMORROW...CV  History of Present Illness      Patient here today for follow-up from bilateral TKA      Having less pain      No wound drainage      Tolerating PT well      Still with some swelling and stiffness  Review of Systems      Constitutional: No fever, No chills.      Respiratory: No shortness of breath, No cough.      Cardiovascular: No chest pain.      Gastrointestinal: No nausea, No vomiting, No diarrhea, No constipation, No heartburn.      Genitourinary: No dysuria, No hematuria.      Hematology/Lymphatics: No bleeding tendency.      Endocrine: No polyuria.      Neurologic: Alert and oriented X4, No numbness, No tingling.      Psychiatric: No anxiety, No depression.      Integumentary:  negative except as documented in history of present illness  Physical Exam   Vitals & Measurements    HR: 97(Peripheral)  BP: 131/69     HT: 170 cm  HT: 170 cm  WT: 102 kg  WT: 102 kg  BMI: 35.29       Bilateral knee exam      Wound healing well without s/s infection      ROM 3-90      Walking with altered gait      Mild swelling to the knee      CMS intact to the bilateral lower extremity               x-rays show intact prosthesis in good position               staples removed in office today      wound care instructions discussed with patient               Plan:      Continue with therapy and home exercises      Follow-up in 1 month  Assessment/Plan       1. S/p total knee replacement, bilateral         Ordered:          Clinic Follow up, *Est. 12/06/18 11:00:00 CST, Order for future visit, S/p total knee replacement, bilateral, [facility]          Post-Op follow-up visit [id number] PC, S/p total knee replacement, bilateral, [facility], 11/06/18 12:56:00 CST           Problem List/Past Medical History    Ongoing     Afib     Anxiety     Atrial fibrillation     Coronary artery disease     Enlarged prostate      HLD (hyperlipidemia)     Hyperlipidemia     Hypertension     Obesity     Obstructive sleep apnea of adult     Osteoarthritis     Primary osteoarthritis of right knee    Historical     Atrial fibrillation     CAD - Coronary artery disease     Diarrhea     Diverticulitis     Gallstones     HLD - Hyperlipidemia     HTN - Hypertension     Osteoarthritis of right shoulder     Primary osteoarthritis of left knee     Rotator cuff impingement syndrome of right shoulder  Procedure/Surgical History     Total Knee Arthroplasty Bilateral (Bilateral) (10/22/2018)     Total Shoulder Arthroplasty (Left) (02/26/2018)     Colon Tattoo (02/09/2018)     Colonoscopy (02/09/2018)     Polypectomy (02/09/2018)     Total Shoulder Arthroplasty (Right) (11/13/2017)     Cholecystectomy Laparoscopic (.) (09/27/2016)     Bypass CABG (.) (04/23/2015)     Intra Aortic Balloon Pump Insertion (04/23/2015)     Left Heart Cath w/COR Angio Ventriculography (04/23/2015)     Bilateral vasectomy for contraception (2007)  Medications    Ambien 5 mg oral tablet, 5 mg= 1 tab(s), Oral, At Bedtime, PRN    amitriptyline 25 mg oral tablet, 25 mg= 1 tab(s), Oral, Once a day (at bedtime)    amitriptyline 25 mg oral tablet, 25 mg= 1 tab(s), Oral, Once a day (at bedtime)    aspirin 81 mg oral Delayed Release (EC) tablet, 81 mg= 1 tab(s), Oral, At Bedtime    aspirin 81 mg oral Delayed Release (EC) tablet, 81 mg= 1 tab(s), Oral, At Bedtime    atorvastatin 40 mg oral tablet, 40 mg= 1 tab(s), Oral, At Bedtime    atorvastatin 40 mg oral tablet, 40 mg= 1 tab(s), Oral, At Bedtime    cloNIDine, 0.1 mg= 1 tab(s), Oral, QID, PRN    Colace 100 mg oral capsule, 100 mg= 1 cap(s), Oral, Daily    hydrALAZINE 20 mg/mL injectable solution, 10 mg= 0.5 mL, IV, q3hr, PRN    magnesium hydroxide, 30 mL, Oral, Daily, PRN    Metamucil, 1 packet(s), Oral, BID, PRN    metoprolol tartrate 25 mg oral tab, 12.5 mg= 0.5 tab(s), Oral, BID, 6 refills    metoprolol tartrate 25 mg oral tab, 12.5  mg= 0.5 tab(s), Oral, BID    MiraLax (polyethylene glycol 3350), 17 gm= 1 packet(s), Oral, Daily, PRN    MiraLax (polyethylene glycol 3350), 17 gm= 1 packet(s), Oral, Daily, PRN    nitroglycerin 0.4 mg sublingual TAB, 0.4 mg= 1 tab(s), SL, q5min, PRN    Norco 5 mg-325 mg oral tablet, 1 tab(s), Oral, q4hr, PRN    oxycodone 5 mg oral tablet, 5 mg= 1 tab(s), Oral, On Call, PRN    Paxil, 20 mg= 1 tab(s), Oral, Daily    Paxil 20 mg oral tablet, 20 mg= 1 tab(s), Oral, Daily, 5 refills    Peace-Colace 50 mg-8.6 mg oral tablet, 2 tab(s), Oral, Once a day (at bedtime), PRN    potassium phosphate-sodium phosphate 250 mg-280 mg-160 mg oral powder for reconstitution, 1 pkt(s), Oral, BID    Robaxin 500 mg oral tablet, 750 mg= 1.5 tab(s), Oral, TID, PRN    Tums 500, 500 mg= 1 tab(s), Oral, BID    Tylenol 325 mg oral tablet, 650 mg= 2 tab(s), Oral, q4hr, PRN    Tylenol 325 mg oral tablet, 650 mg= 2 tab(s), Oral, q4hr, PRN    Tylenol 325 mg oral tablet, 650 mg= 2 tab(s), Oral, q4hr    warfarin 2 mg oral tablet, 4 mg= 2 tab(s), Oral, At Bedtime    warfarin 2.5 mg oral tablet, 2.5 mg= 1 tab(s), Oral, At Bedtime, 1 refills    Zofran ODT 4 mg oral tablet, disintegrating, 8 mg= 2 tab(s), Oral, QID, PRN  Allergies    No Known Allergies  Social History     Alcohol      Past, 09/27/2018      Current, Wine, 1-2 times per year, Started age 22 Years. Previous treatment: None. Alcohol use interferes with work or home: No. Drinks more than intended: No. Others hurt by drinking: No. Ready to change: No. 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 Activity level: Heavy physical work. Highest education level: High school. Operates hazardous equipment: No. Workplace hazards: Heavy lifting/twisting., 01/06/2016     Exercise      Exercise frequency: Daily. Self assessment: Good condition. Exercise type: has an exercise machine.,  06/03/2015     Home/Environment      Lives with Significant other. Living situation: Home/Independent. Home equipment: CPAP/BiPAP. Alcohol abuse in household: No. Substance abuse in household: No. Smoker in household: Yes. Injuries/Abuse/Neglect in household: No. Feels unsafe at home: No. Family/Friends available for support: Yes. Concern for family members at home: No. Major illness in household: No. Financial concerns: No. TV/Computer concerns: No., 01/06/2016     Nutrition/Health      Caffeine intake amount: coffee 1-2 cups in morning. Wants to lose weight: Yes. Sleeping concerns: Yes. Feels highly stressed: No., 03/02/2016      Type of diet: cardiac diet low salt. low salt cardiac diet, Wants to lose weight: Yes. Sleeping concerns: Yes. Feels highly stressed: Yes., 01/06/2016     Substance Abuse - Denies Substance Abuse, 05/19/2015      Never, 02/06/2018     Tobacco      Former smoker, No, 11/06/2018      Former smoker, N/A, 10/25/2018      Former smoker, Cigarettes, 10 year(s). Started age 17.0 Years. Stopped age 28 Years. Household tobacco concerns: No., 06/03/2015  Family History    Acute myocardial infarction.: Father and Brother.    Brain aneurysm: Mother.    Cardiac arrest.: Brother.    Primary malignant neoplasm of prostate: Father.  Immunizations      Vaccine Date Status Comments      pneumococcal 23-polyvalent vaccine 02/03/2018 Given      tetanus/diphtheria/pertussis, acel(Tdap) 02/03/2018 Given other (see comment)      influenza virus vaccine, inactivated 01/06/2016 Given Other :  NOT LATE  Health Maintenance   Health Maintenance      Pending (in the next year)         OverDue            Coronary Artery Disease Maintenance-Lipid Lowering Therapy due  and every             Pneumococcal Vaccine due  and every             ADL Screening due  12/13/16  and every 1  year(s)         Due             Alcohol Misuse Screening due  11/06/18  and every 1  year(s)            Cognitive Screening due  11/06/18  and  every 1  year(s)            Functional Assessment due  11/06/18  and every 1  year(s)            Geriatric Depression Screening due  11/06/18  and every 1  year(s)            Zoster Vaccine due  11/06/18  and every 100  year(s)         Due In Future             Smoking Cessation not due until  02/01/19  and every 1  year(s)            Advance Directive not due until  09/18/19  and every 1  year(s)            Coronary Artery Disease Maintenance-Electrocardiogram not due until  10/25/19  and every 1  year(s)            Hypertension Management-BMP not due until  10/29/19  and every 1  year(s)            Coronary Artery Disease Maintenance-BMP not due until  10/29/19  and every 1  year(s)            HF-Heart Failure Education not due until  11/05/19  and every 1  year(s)            Aspirin Therapy for CVD Prevention not due until  11/05/19  and every 1  year(s)      Satisfied (in the past 1 year)         Satisfied             Advance Directive on  09/18/18.  Satisfied by Radha Johnson LPN            Aspirin Therapy for CVD Prevention on  11/05/18.  Satisfied by Otis GILLETTE, Luisito            Blood Pressure Screening on  11/06/18.  Satisfied by Judy Hou LPN            Body Mass Index Check on  11/06/18.  Satisfied by Judy Hou LPN            Colorectal Screening ([facility]) on  02/09/18.            Coronary Artery Disease Maintenance-Antiplatelet Agent Prescribed on  11/06/18.  Satisfied by Rebel Laguerre MD, Russ DYE            Depression Screening on  11/06/18.  Satisfied by Judy Hou LPN            Diabetes Screening on  10/29/18.  Satisfied by Garcia Spear            Fall Risk Assessment on  11/06/18.  Satisfied by Junie GILLETTE, Brionna MATTHEWS            Hypertension Management-Blood Pressure on  11/06/18.  Satisfied by Judy Hou LPN            Influenza Vaccine on  10/25/18.  Satisfied by Ave GILLETTE, Effie TAYLOR            Lipid Screening on  08/09/18.  Satisfied by  Wilian Perez.            Obesity Screening on  11/06/18.  Satisfied by Judy Hou LPN            Pneumococcal Vaccine on  02/03/18.  Satisfied by Cait Novoa LPN            Smoking Cessation on  02/01/18.  Satisfied by Torie Reeves RN            Smoking Cessation (Coronary Artery Disease) on  02/01/18.  Satisfied by Torie Reeves RN            Tetanus Vaccine on  02/03/18.  Satisfied by Cait Novoa LPN

## 2022-04-30 NOTE — OP NOTE
ADMITTING DIAGNOSIS:  Need for age-appropriate screening colonoscopy.    PROCEDURE:  Colonoscopy to the cecum with intubation of ileocecal valve.    POSTOPERATIVE DIAGNOSES:    1. Normal terminal ileum up to 20 cm.   2. Normal colonoscopy with a polyp at 70 cm removed with a hot loop snare, marked with an ink spot.   3. Diverticulosis of the sigmoid colon.   4. Good tone.  No masses.  Grade 2 internal hemorrhoids.    PLAN:    1. Follow up in the office to discuss polypectomy results.   2. Follow up in 2 years to recheck stools for blood.   3. Follow up in 10 years to repeat screening colonoscopy.    INDICATIONS:  The patient is a 68-year-old  male with atrial fibrillation, on Coumadin, who has anxiety as well as coronary disease and sleep apnea.  He was on a CPAP mask and has an enlarged prostate, hypercholesterolemia, hypertension with osteoarthritis.  He had a 3-vessel bypass in the past.  He presented to me initially with small bowel obstruction and then passed his bowels and had a soft abdomen with good bowel movements.  He had not had a prior screening colonoscopy and as a result, was scheduled for this.  The patient was brought to the GI suite, underwent sedation and examination of the colon all the way to the cecum with intubation of the ileocecal valve.  The terminal ileum was normal.  Cecum, ascending colon, transverse colon and descending colon were normal.  Rectosigmoid colon had diverticulosis.  Grade 2 internal hemorrhoids were noted.  No other pathology was seen.  Overall, the patient did very well.  Had no problems or difficulties.  Was awakened and sent to recovery in good condition.       I appreciate the consultation referral from Dr. Mat Huffman.  I will notify him of my findings.        BBS/ADAM   DD: 02/09/2018 1451   DT: 02/09/2018 1511  Job # [id number]    cc: Dr Mat Huffman

## 2022-04-30 NOTE — H&P
"   Patient:   Damon Adkins            MRN: [id number]            FIN: [id number]               Age:   67 years     Sex:  Male     :  1950   Associated Diagnoses:   None   Author:   Ajit Gutierrez MD      Chief Complaint   Abdominal pain x 2 days      History of Present Illness   66 y/o  male transferred from [facility] last night with c/o abdominal pain x 2 days. Patient is a poor historian, difficult to history from. Pain is generalized abdominal pain with associated diarrhea. Reports having "peanut butter" colored diarrhea several times per day since Tuesday. Reports moderate abdominal pain in right and left lower abdominal quads. Has not taken any medication to stop diarrhea. Can not describe any relieving or exacerbating factors. Denies N/V, fever, chills. Denies any sick contacts.      Today patient feels much better than he has felt. Tolerated CLD this am without N/V. Minimal abdominal pain. One BM this am-diarrhea in consistency; reported "lemonade" colored      Review of Systems   Constitutional:  No fever, No chills, No sweats.    Respiratory:  No cough.    Gastrointestinal:  Negative except as documented in history of present illness.    Hematology/Lymphatics:  Negative.    Endocrine:  Negative.    Immunologic:  Negative.    Musculoskeletal:  Negative.    Integumentary:  No rash.    Neurologic:  Negative.    Psychiatric:  Negative.       Health Status   Allergies:    Allergic Reactions (Selected)  No Known Allergies   Current medications:  (Selected)   Inpatient Medications  Ordered  Ambien 5 mg oral tablet: 5 mg, form: Tab, Oral, Once a day (at bedtime) PRN for sleep, first dose 18 21:53:00 CST  Dilaudid: 0.2 mg, form: Injection, IV Push, q4hr PRN for pain, first dose 18 21:54:00 CST  NS (0.9% Sodium Chloride) Infusion 1,000 mL: 1,000 mL, 1,000 mL, IV, 75 mL/hr, start date 18 22:20:00 CST  Pneumovax 23: 0.5 mL, form: Injection, IM, Once, first dose " 18 5:16:00 CST, stop date 18 5:16:00 CST, Waste Code BKC  Zofran 2 mg/mL injectable solution: 4 mg, form: Injection, IV Push, q4hr PRN for nausea, first dose 18 21:56:00 CST  tetanus/diphth/pertussis (Tdap) adult/adol 5 units-2 units-15.5 mcg/0.5 mL intramuscular suspension...: 0.5 mL, form: Injection, IM, As Directed PRN for other (see comment), first dose 18 5:16:00 CST  Prescriptions  Prescribed  Colace 100 mg oral capsule: 200 mg = 2 cap(s), Oral, Daily, # 56 cap(s), 0 Refill(s)  Lipitor 40 mg oral tablet: 40 mg = 1 tab(s), Oral, Daily, # 30 tab(s), 6 Refill(s), Pharmacy: [facility]  Paxil 20 mg oral tablet: 20 mg = 1 tab(s), Oral, Daily, # 30 tab(s), 5 Refill(s), Pharmacy: [facility]  Percocet 5/325 oral tablet: 1 tab(s), Oral, q6hr, PRN PRN pain, # 50 tab(s), 0 Refill(s), Pharmacy: [facility]  Percocet 5/325 oral tablet: 1-2 tab(s), Oral, q4hr, PRN PRN pain, # 60 tab(s), 0 Refill(s)  Valium 5 mg oral tablet: 5 mg = 1 tab(s), Oral, q6hr, PRN PRN spasm, # 30 tab(s), 0 Refill(s)  enoxaparin 40 mg/0.4 mL subcutaneous solution: 40 mg, Subcutaneous, Daily, # 7 syringe(s), 0 Refill(s)  metoprolol tartrate 25 mg oral tab: 12.5 mg = 0.5 tab(s), Oral, BID, # 30 tab(s), 6 Refill(s), Pharmacy: [facility]  warfarin 2.5 mg oral tablet: 2.5 mg = 1 tab(s), Oral, At Bedtime, Dr DAYNA Vargas., # 90 tab(s), 1 Refill(s), Pharmacy: [facility]  Documented Medications  Documented  AMITRIPTYLIN TAB 25M mg = 1 tab(s), Oral, qPM  APAP/CODEINE -30MG:   LISINOPRIL 2.5 MG TABLET:   TAMSULOSIN HCL 0.4 MG CAPSULE: = 1 tab(s), Oral, Daily   Problem list:    All Problems  Afib / SNOMED CT [id number] / Confirmed  Anxiety / SNOMED CT 52181412 / Confirmed  Takes Paxil when having stress at work  Bursitis of left shoulder / SNOMED CT 0490627502 / Confirmed  CABG x 3 - Coronary artery bypass grafts x 3 / SNOMED CT 109318649 /  Confirmed  2015 april  CAD (coronary atherosclerotic disease) / SNOMED CT [id number] / Confirmed  CPAP (continuous positive airway pressure) ventilation weaning protocol / SNOMED CT 5536734649 / Confirmed  Osteoarthritis of right shoulder / SNOMED CT 960302455 / Confirmed  Diarrhea / SNOMED CT 547950825 / Confirmed  Enlarged prostate / SNOMED CT 020816634 / Confirmed  Gallstones / SNOMED CT 085434866 / Confirmed  HLD (hyperlipidemia) / SNOMED CT [id number]-[id number] / Confirmed  Hypertension / SNOMED CT [id number] / Confirmed  Impaired mobility / SNOMED CT 823065669 / Confirmed  Obstructive sleep apnea of adult / SNOMED CT 5603670044 / Confirmed  Osteoarthritis / SNOMED CT [id number] / Confirmed  Osteoarthritis of left shoulder region / SNOMED CT 2267337055 / Confirmed  Rotator cuff impingement syndrome of right shoulder / SNOMED CT 535919772 / Confirmed  Sleep apnea / SNOMED CT [id number] / Confirmed      Histories   Past Medical History:    Active  Anxiety ([id number])  Comments:  4/24/2015 CDT 5:35 CDT - Sharifa GILLETTE, Gregory Harris Paxil when having stress at work  Obstructive sleep apnea of adult ([id number])   Family History:    Primary malignant neoplasm of prostate  Father  Brain aneurysm  Mother  Acute myocardial infarction.  Brother  Father  Cardiac arrest.  Brother     Procedure history:    Total Shoulder Arthroplasty (Right) on 11/13/2017 at 67 Years.  Comments:  11/13/2017 12:56 - Mikey Al RN  auto-populated from documented surgical case  Cholecystectomy Laparoscopic (.) on 9/27/2016 at 66 Years.  Comments:  9/27/2016 15:22 - Cintia Mallory RN  auto-populated from documented surgical case  Bypass CABG (.) on 4/23/2015 at 65 Years.  Comments:  4/23/2015 19:15 - Amanda GILLETTE, Sandra Roberts  auto-populated from documented surgical case  Intra Aortic Balloon Pump Insertion on  4/23/2015 at 65 Years.  Comments:  4/28/2015 10:Soumya - Trudy Bonds  auto-populated from documented surgical case  Left Heart Cath w/COR Angio Ventriculography on 4/23/2015 at 65 Years.  Comments:  4/28/2015 10:Soumya - Trudy Bonds  auto-populated from documented surgical case  Bilateral vasectomy for contraception ([id number]) in 2007 at 57 Years.  Cholecystectomy ([id number]).   Social History        Social & Psychosocial Habits    Alcohol  01/28/2016  Use: Current    Type: Wine    Frequency: 1-2 times per year    Started at age: 22 Years    Previous treatment: None    Has alcohol use interfered with work or home life? No    Do you ever drink more than intended? No    Has anyone been hurt or at risk by your drinking? No    Ready to change: No    Concerns about alcohol use in household: No    Comment: Drinks socially when out with elizabeth - 04/24/2015 05:42 - Gregory Skaggs RN    Employment/School  01/06/2016  Status: Unemployed    Description: construction    Previous employment/school: self employed    Activity level: Heavy physical work    Highest education: High school    Hazardous equipment operation: No    Work hazards: Heavy lifting/twisting    Exercise  06/03/2015  Times per week: Daily    Self assessment: Good condition    Exercise type: has an exercise machine    Home/Environment  01/06/2016  Lives with: Significant other    Living situation: Home/Independent    Home equipment: CPAP/BiPAP    Alcohol abuse in household: No    Substance abuse in household: No    Smoker in household: Yes    Injuries/Abuse/Neglect in household: No    Feels unsafe at home: No    Family/Friends available to help: Yes    Concern for family members at home: No    Major illness in household: No    Financial concerns: No    Concerns over TV/Computer/Game use: No    Comment: has 3 children - 06/03/2015 08:12 - Corona Maguire LPN [facility]    Nutrition/Health  01/06/2016  Diet description: cardiac diet low salt    Type of diet:  low salt cardiac diet    Wants to lose weight: Yes    Sleeping concerns: Yes    Feels highly stressed: Yes    03/02/2016  Caffeine intake amount: coffee 1-2 cups in morning    Wants to lose weight: Yes    Sleeping concerns: Yes    Feels highly stressed: No    Substance Abuse  05/19/2015 Risk Assessment: Denies Substance Abuse    Tobacco  06/03/2015  Use: Former smoker    Type: Cigarettes    Number of years: 10    Started at age: 17.0 Years    Stopped at age: 28 Years    Concerns about tobacco use in household: No    02/25/2016  Use: Former smoker  .        Physical Examination   General:  Alert and oriented, No acute distress.    Eye:  Extraocular movements are intact.    HENT:  Normocephalic.    Neck:  Supple.    Respiratory:  Respirations are non-labored, Symmetrical chest wall expansion.    Cardiovascular:  Normal rate, Regular rhythm.    Gastrointestinal:  Soft, Non-distended, TTP R and L lower quads.    Vital Signs   2/2/2018 8:00 CST        Temperature Oral          36.8 DegC                             Temperature Oral (calculated)             98.24 DegF                             Heart Rate Monitored      73 bpm                             Respiratory Rate          18 br/min                             SpO2                      96 %                             Systolic Blood Pressure   128 mmHg                             Diastolic Blood Pressure  84 mmHg     Musculoskeletal:  Normal range of motion.    Integumentary:  Warm, Dry, No rash.    Neurologic:  Alert, Oriented.    Psychiatric:  Cooperative.       Review / Management   Laboratory Results   Today's Lab Results : PowerNote Discrete Results   2/2/2018 3:11 CST        WBC                       9.5 x10(3)/mcL                             RBC                       5.02 x10(6)/mcL                             Hgb                       14.7 gm/dL                             Hct                       44.7 %                             Platelet                   279 x10(3)/mcL                             MCV                       89 fL                             MCH                       29 pg                             MCHC                      33 gm/dL                             RDW                       14.9 %                             MPV                       11.1 fL  HI                             Abs Neut                  5.8 x10(3)/mcL                             Segs Man                  58 %                             Lymph Man                 26 %                             Monocyte Man              13 %  HI                             Eos Man                   3 %                             Basophil Man              0 %                             Platelet Est              Adequate                             PSA                       2.42 ng/mL                             TSH                       4.640 mIU/mL  HI    2/1/2018 10:00 CST       WBC                       10.9 x10(3)/mcL                             RBC                       5.11 x10(6)/mcL                             Hgb                       15.4 gm/dL                             Hct                       45.6 %                             Platelet                  258 x10(3)/mcL                             MCV                       89.2 fL                             MCH                       30.2 pg                             MCHC                      33.9 gm/dL                             RDW                       14.8 %                             MPV                       8.7 fL                             Abs Neut                  8.10 x10(3)/mcL                             Neutro Auto               74 %                             Lymph Auto                14 %                             Mono Auto                 12 %  HI                             Eos Auto                  0 %  NA                             Basophil Auto             0 %                             Abs Neutro                 8.10 x10(3)/mcL                             Abs Lymph                 1.5 x10(3)/mcL  NA                             Abs Mono                  1.3 x10(3)/mcL  NA                             Abs Baso                  0.1 x10(3)/mcL  NA                             PT                        14.9 second(s)  HI                             INR                       1.44  LOW                             Sodium Lvl                136 mmol/L                             Potassium Lvl             3.8 mmol/L                             Chloride                  100 mmol/L                             CO2                       28.7 mmol/L                             Calcium Lvl               9.4 mg/dL                             Glucose Lvl               114 mg/dL  HI                             BUN                       14.4 mg/dL                             Creatinine                1.14 mg/dL                             eGFR-AA                   >60 mL/min/1.73 m2  NA                             eGFR-FRITZ                  >60 mL/min/1.73 m2  NA                             Bili Total                1.5 mg/dL  HI                             Bili Direct               0.46 mg/dL  HI                             Bili Indirect             1.07 mg/dL  HI                             AST                       37 unit/L                             ALT                       59 unit/L                             Alk Phos                  81 unit/L                             Total Protein             8.3 gm/dL  HI                             Albumin Lvl               4.40 gm/dL                             Globulin                  3.90 gm/dL  HI                             A/G Ratio                 1.1 ratio        Radiology results   Rad Results ()   Accession: [id number]  Order: XR Abdomen Flat and Erect  Report Dt/Tm: 02/02/2018 09:56  Report:   ABDOMEN 2 VIEW  HISTORY: small bowel obstruction;Abdominal Pain           COMPARISON: 2/1/2018     Upright and supine views reveal residual oral contrast within the  colon. There is significant interim decrease in gaseous dilatation of  small bowel loops since the prior exam. Residual intravenous contrast  is seen within the bladder. Degenerative changes are noted to the  lumbar spine. Surgical clips are noted to the right upper abdomen.     IMPRESSION:  1. Interim considerable decrease in dilated gas-filled loops of small  bowel since prior exam with oral contrast seen in the colon up to the  sigmoid  2. Lumbar spondylosis  3. Postsurgical changes right upper abdomen  4. Residual contrast present within the bladder            Reason For Exam  pre op;Other (please specify)    Radiology Report  CHEST 2 VIEWS :     HISTORY: pre op;Other (please specify)          COMPARISON: 10/17/2017     FINDINGS: PA/AP and lateral views reveal the heart to be mildly  enlarged. Postoperative changes are present. The trachea is midline. A  right shoulder prosthetic is present. No definite infiltrate or  effusion is seen. Post sternotomy changes are present.     IMPRESSION:  1.No active cardiopulmonary disease identified       Signature Line  Electronically Signed By: Noe Padilla MD  Date/Time Signed: 02/02/2018 11:00      This document has an image         Impression and Plan   68 y/o male c/o abdominal pain with associated diarrhea x 3 days. Ileus vs. obstruction. Abd Xray done today shows improvement.  Labs notable for: WBC 9.5, TSH 4.6, PSA 2.4    Plan:  1) Consult Dr. Willis for medical mgmt  2) IVF NS 75cc/hr  3) CXR and flat and erect abd xray  4) collect stools for OB and culture  5) clear liquid diet  6) Labs- CBC, CMP, TSH, PSA  7) Zofran PRN nausea; dilaudid PRN pain

## 2022-05-02 NOTE — HISTORICAL [facility]ER
This is a historical note converted from [place]. Formatting and pictures may have been removed.  Please reference [place] for original formatting and attached multimedia. Chief Complaint  PATIENT HERE FOR A FOLLOW UP OF HIS RIGHT TSR DONE ON 11/13/17. DOING GOOD  History of Present Illness  This patient has had a right shoulder replacement overall has done well enough pain medication is no longer taking narcotics and his therapist asked  Physical Exam  Vitals & Measurements  HR:?73?(Peripheral)? BP:?138/56?  HT:?170?cm? HT:?170?cm? WT:?95.6?kg? WT:?95.6?kg? BMI:?33.08?  This patient examination as well as surgical wound is for elevation of 200? external rotation is 45? he has good strength in internal/external rotation.? His biggest problem now is his left shoulder.  ??????  ???  PHYSICAL FINDING  ??  Neck:  ??NOTED ?Pain radiating to the shoulder Percervical  ???  Musculoskeletal:  ? NO swelling of the right acromioclavicular joint.  ? NO swelling of the left acromioclavicular joint.  ???  General Appearance:  ? In NO acute distress.  ???  Eyes:  General/bilateral:  Sclera: ? Normal.  ???  Ears:  General/bilateral:  Outer Ear: ? Normal.  ???  Lungs:  ? Respiratory excursion normal and symmetric.  ???  Cardiovascular:  Heart Rate and Rhythm: ? Normal.  Arterial Pulses: ? Ulnar pulses 1+ right. ? Radial pulse 1+ right. ? Radial pulse 1+ left.  ???  Abdomen:  ? Normal.  ???  ???  Musculoskeletal System:  ???  Shoulder:  ?.  ? NO atrophy of the deltoid muscle  ? NO atrophy of the supraspinatus muscle  ? NO atrophy of the infraspinatus muscle  ? NO pain was elicited during a lift-off test of the shoulder?  ???  Right Shoulder: ?. ?pain is gone  Left Shoulder: ? . ? Acromial tenderness on palpation. ? Tenderness on palpation of the subacromial bursa. ? Tenderness on palpation of the deltoid muscle. ? Tenderness on palpation of the supraspinatus muscle. ? Tenderness on palpation of the infraspinatus muscle. ? Tenderness on  palpation of the glenohumeral joint region. ? Tenderness on palpation at the bicipital groove. ? Tenderness on palpation of the trapezius muscle. ? Subacromial crepitus on motion was noted.  ???  Right Shoulder:  ???  Shoulder Motion:??????????????Value???? ????Normal Range  ???  Active abduction ?????????????? 00 degrees  Active forward flexion ?????????????? 00 degrees  Active external rotation?????????????? 00 degrees  Active external rotation?????????????? 00 degrees  ???  ? NO swelling medial sternoclavicular.  ? NO swelling.  ? NO induration.  ? NO erythema.  ? NO long head biceps deformity.  ? NO winged scapula.  ?  ? Motion was normal.  ?? pain was elicited during a Neer impingement test.  ?? pain was elicited during a Dye-Gene impingement test.  ?   ??????????????????????????????????????????????????????????????????????????  Left Shoulder:  ???  Shoulder Motion:??????Normal Range  ???  Active forward flexion??????????????? 120 degrees  Active external rotation?????????????? 10 degrees  Active internal rotation?????????????? 10 degrees??  ???????????????????????????????????????????????????????????????????????????????  Clavicle:  ???  General/bilateral: ? Acromioclavicular joint showed NO pain elicited by motion distal right.  ???  Right Clavicle: ? Right sternoclavicular joint showed tenderness on palpation. ? Right acromioclavicular joint showed tenderness on palpation.  ???  Left Clavicle: ? Left sternoclavicular joint showed tenderness on palpation. ? Left acromioclavicular joint showed tenderness on palpation.  ???  ???  Neurological:  ???  ? NO abnormalities were noted Sensibility intact distally on right.  ? Oriented to time, place, and person.  ???  Sensation:  ? NO sensory exam abnormalities were noted Decreased median sensation on right.  ? NO sensory exam abnormalities were noted Decreased ulnar sensation on right.  ? NO sensory exam abnormalities were noted Decreased radial sensation on  right.  ???  Motor (Strength):  ? NO weakness of the right shoulder was observed.  ? NO weakness of the left shoulder was observed.  ??????????????????????????????????????????????????????????????????????????????  Skin:  ? NO ecchymosis on the shoulders left.  ? NO ecchymosis on the shoulders right.  ???  Injury / Incision Site: ? NO scar.  ???  TESTS  ???  Imaging:  ???  X-Ray Shoulder:?Complete, two or more views of the true AP of the glenohumeral joint were performed??????????????????????????????????????????????????????????????????????????????????  ?  ?   This patients x-rays of the left shoulder reveal osteoarthritis of the glenohumeral joint?with bone spur formation and joint space narrowing  ????  ???  ASSESSMENT  ?   ? Localized primary osteoarthritis of the right shoulder glenohumeral joint  Localized primary arthritic osteoarthritis of the left shoulder.  ?   This patient in the long run is doing well with his right shoulder that eventually need to have a left shoulder replacement  ???  ???  PLAN  ?   ? Physical therapy service  ? Home exercises  Assessment/Plan  1.?Osteoarthritis of right shoulder  ?  2.?Osteoarthritis of left shoulder region  ?  Left shoulder pain  Ordered:  XR Shoulder Left Minimum 2 Views, Routine, 01/09/18 9:54:00 CST, Pain, None, Ambulatory, Patient Has IV?, Rad Type, Left shoulder pain, 01/09/18 9:54:00 CST  ?   Problem List/Past Medical History  Ongoing  Afib  Anxiety  Bursitis of left shoulder  CABG x 3 - Coronary artery bypass grafts x 3  CAD (coronary atherosclerotic disease)  CPAP (continuous positive airway pressure) ventilation weaning protocol  Enlarged prostate  Gallstones  HLD (hyperlipidemia)  Hypertension  Obstructive sleep apnea of adult  Osteoarthritis  Osteoarthritis of left shoulder region  Osteoarthritis of right shoulder  Rotator cuff impingement syndrome of right shoulder  Sleep apnea  Historical  Procedure/Surgical History  Replacement of Right Shoulder Joint with  Synthetic Substitute, Open Approach (11/13/2017)  Total Shoulder Arthroplasty (Right) (11/13/2017)  Cholecystectomy Laparoscopic (.) (09/27/2016)  Resection of Gallbladder, Percutaneous Endoscopic Approach (09/27/2016)  Change Drainage Device in Peritoneal Cavity, External Approach (12/16/2015)  Fluoroscopy of Gallbladder using Low Osmolar Contrast (12/16/2015)  Drainage of Gallbladder with Drainage Device, Percutaneous Approach (12/15/2015)  (Aorto)coronary bypass of two coronary arteries (04/23/2015)  Angiocardiography of left heart structures (04/23/2015)  Bypass CABG (.) (04/23/2015)  Continuous invasive mechanical ventilation for less than 96 consecutive hours (04/23/2015)  Coronary arteriography using two catheters (04/23/2015)  Extracorporeal circulation auxiliary to open heart surgery (04/23/2015)  Implant of pulsation balloon (04/23/2015)  Intra Aortic Balloon Pump Insertion (04/23/2015)  Left heart cardiac catheterization (04/23/2015)  Left Heart Cath w/COR Angio Ventriculography (04/23/2015)  Single internal mammary-coronary artery bypass (04/23/2015)  Bilateral vasectomy for contraception (2007)  Medications  AMITRIPTYLIN TAB 25MG, 25 mg= 1 tab(s), Oral, qPM,? ?Not taking  APAP/CODEINE -30MG  Colace 100 mg oral capsule, 200 mg= 2 cap(s), Oral, Daily,? ?Not taking  enoxaparin 40 mg/0.4 mL subcutaneous solution, 40 mg, Subcutaneous, Daily  Lipitor 40 mg oral tablet, 40 mg= 1 tab(s), Oral, Daily, 6 refills  LISINOPRIL 2.5 MG TABLET  metoprolol tartrate 25 mg oral tab, 12.5 mg= 0.5 tab(s), Oral, BID, 6 refills  Paxil 20 mg oral tablet, 20 mg= 1 tab(s), Oral, Daily, 5 refills  Percocet 5/325 oral tablet, 1 tab(s), Oral, q6hr, PRN,? ?Not taking  Percocet 5/325 oral tablet, 1-2 tab(s), Oral, q4hr, PRN,? ?Not taking  TAMSULOSIN HCL 0.4 MG CAPSULE, 1 tab(s), Oral, Daily  Valium 5 mg oral tablet, 5 mg= 1 tab(s), Oral, q6hr, PRN,? ?Not taking  warfarin 2.5 mg oral tablet, 2.5 mg= 1 tab(s), Oral, At Bedtime,  1 refills,? ?Not taking  Allergies  No Known Allergies  Social History  Alcohol - 03/02/2016  Current, Wine, 1-2 times per year, Started age 22 Years. Previous treatment: None. Alcohol use interferes with work or home: No. Drinks more than intended: No. Others hurt by drinking: No. Ready to change: No. Household alcohol concerns: No.  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 Activity level: Heavy physical work. Highest education level: High school. Operates hazardous equipment: No. Workplace hazards: Heavy lifting/twisting.  Exercise - 03/02/2016  Exercise frequency: Daily. Self assessment: Good condition. Exercise type: has an exercise machine.  Home/Environment - 03/02/2016  Lives with Significant other. Living situation: Home/Independent. Home equipment: CPAP/BiPAP. Alcohol abuse in household: No. Substance abuse in household: No. Smoker in household: Yes. Injuries/Abuse/Neglect in household: No. Feels unsafe at home: No. Family/Friends available for support: Yes. Concern for family members at home: No. Major illness in household: No. Financial concerns: No. TV/Computer concerns: No.  Nutrition/Health - 03/02/2016  Caffeine intake amount: coffee 1-2 cups in morning. Wants to lose weight: Yes. Sleeping concerns: Yes. Feels highly stressed: No.  Type of diet: cardiac diet low salt. low salt cardiac diet, Wants to lose weight: Yes. Sleeping concerns: Yes. Feels highly stressed: Yes.  Substance Abuse - Denies Substance Abuse, 03/02/2016  Tobacco - 03/02/2016  Former smoker  Former smoker, Cigarettes, 10 year(s). Started age 17.0 Years. Stopped age 28 Years. Household tobacco concerns: No.  Family History  Acute myocardial infarction.: Father and Brother.  Brain aneurysm: Mother.  Cardiac arrest.: Brother.  Primary malignant neoplasm of prostate: Father.

## 2022-05-02 NOTE — HISTORICAL [facility]
This is a historical note converted from [place]. Formatting and pictures may have been removed.  Please reference [place] for original formatting and attached multimedia. Chief Complaint  PATIENT HERE FOR A POST OP VISIT OF HIS LEFT TSR DONE ON 02/26/18 GL 05/27/18  History of Present Illness  Patient here today for follow-up from left?shoulder arthroplasty  Having less pain  No wound drainage  Tolerating PT well  Still with some stiffness and muscle weakness  Review of Systems  Constitutional: No fever, No chills.  Respiratory: No shortness of breath, No cough.  Cardiovascular: No chest pain.  Gastrointestinal: No nausea, No vomiting, No diarrhea, No constipation, No heartburn.  Genitourinary: No dysuria, No hematuria.  Hematology/Lymphatics: No bleeding tendency.  Endocrine: No polyuria.  Neurologic: Alert and oriented X4, No numbness, No tingling.  Psychiatric: No anxiety, No depression.  Physical Exam  Vitals & Measurements  BP:?108/62?  HT:?170?cm? HT:?170?cm? WT:?105.2?kg? WT:?105.2?kg? BMI:?36.4?  Assessment/Plan  1.?Status post total replacement of left shoulder  Ordered:  acetaminophen-oxyCODONE, 1 tab(s), Oral, q6hr, PRN PRN pain, # 30 tab(s), 0 Refill(s), Pharmacy: [id number]  Clinic Follow up, *Est. 04/12/18 9:15:00 CDT, Order for future visit, Status post total replacement of left shoulder, [facility]  Post-Op follow-up visit [id number] , Status post total replacement of left shoulder, [facility], 03/13/18 9:23:00 CDT  ?   Problem List/Past Medical History  Ongoing  Afib  Anxiety  Bursitis of left shoulder  CABG x 3 - Coronary artery bypass grafts x 3  CAD (coronary atherosclerotic disease)  CPAP (continuous positive airway pressure) ventilation weaning protocol  Diarrhea  Diverticulitis  Enlarged prostate  Gallstones  HLD (hyperlipidemia)  Hypertension  Obstructive sleep apnea of adult  Osteoarthritis  Osteoarthritis of left shoulder region  Osteoarthritis of right  shoulder  Rotator cuff impingement syndrome of right shoulder  Sleep apnea  Historical  No qualifying data  Procedure/Surgical History  Replacement of Left Shoulder Joint with Synthetic Substitute, Open Approach (02/26/2018), Total Shoulder Arthroplasty (Left) (02/26/2018), Colon Tattoo (02/09/2018), Colonoscopy (02/09/2018), Colonoscopy, flexible; with removal of tumor(s), polyp(s), or other lesion(s) by snare technique (02/09/2018), Excision of Descending Colon, Via Natural or Artificial Opening Endoscopic, Diagnostic (02/09/2018), Polypectomy (02/09/2018), Replacement of Right Shoulder Joint with Synthetic Substitute, Open Approach (11/13/2017), Total Shoulder Arthroplasty (Right) (11/13/2017), Cholecystectomy Laparoscopic (.) (09/27/2016), Resection of Gallbladder, Percutaneous Endoscopic Approach (09/27/2016), Change Drainage Device in Peritoneal Cavity, External Approach (12/16/2015), Fluoroscopy of Gallbladder using Low Osmolar Contrast (12/16/2015), Drainage of Gallbladder with Drainage Device, Percutaneous Approach (12/15/2015), (Aorto)coronary bypass of two coronary arteries (04/23/2015), Angiocardiography of left heart structures (04/23/2015), Bypass CABG (.) (04/23/2015), Continuous invasive mechanical ventilation for less than 96 consecutive hours (04/23/2015), Coronary arteriography using two catheters (04/23/2015), Extracorporeal circulation auxiliary to open heart surgery (04/23/2015), Implant of pulsation balloon (04/23/2015), Intra Aortic Balloon Pump Insertion (04/23/2015), Left heart cardiac catheterization (04/23/2015), Left Heart Cath w/COR Angio Ventriculography (04/23/2015), Single internal mammary-coronary artery bypass (04/23/2015), Bilateral vasectomy for contraception (2007), Cholecystectomy.  Medications  amitriptyline 25 mg oral tablet, 50 mg= 2 tab(s), Oral, Once a day (at bedtime)  Colace 100 mg oral capsule, 200 mg= 2 cap(s), Oral, Daily  Lipitor 40 mg oral tablet, 40 mg= 1 tab(s),  Oral, Daily, 6 refills  LISINOPRIL 2.5 MG TABLET, 1 tab, Oral, qAM  metoprolol tartrate 25 mg oral tab, 12.5 mg= 0.5 tab(s), Oral, BID, 6 refills  Paxil 20 mg oral tablet, 20 mg= 1 tab(s), Oral, Daily, 5 refills  Percocet 5/325 oral tablet, 1-2 tab(s), Oral, q4hr, PRN  Percocet 5/325 oral tablet, 1 tab(s), Oral, q6hr, PRN  TAMSULOSIN HCL 0.4 MG CAPSULE, 1 tab(s), Oral, Daily  Valium 5 mg oral tablet, 5 mg= 1 tab(s), Oral, q6hr, PRN  warfarin 2.5 mg oral tablet, 2.5 mg= 1 tab(s), Oral, At Bedtime, 1 refills  Allergies  No Known Allergies  Social History  Alcohol  Current, Wine, 1-2 times per year, Started age 22 Years. Previous treatment: None. Alcohol use interferes with work or home: No. Drinks more than intended: No. Others hurt by drinking: No. Ready to change: No. 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 Activity level: Heavy physical work. Highest education level: High school. Operates hazardous equipment: No. Workplace hazards: Heavy lifting/twisting., 01/06/2016  Exercise  Exercise frequency: Daily. Self assessment: Good condition. Exercise type: has an exercise machine., 06/03/2015  Home/Environment  Lives with Significant other. Living situation: Home/Independent. Home equipment: CPAP/BiPAP. Alcohol abuse in household: No. Substance abuse in household: No. Smoker in household: Yes. Injuries/Abuse/Neglect in household: No. Feels unsafe at home: No. Family/Friends available for support: Yes. Concern for family members at home: No. Major illness in household: No. Financial concerns: No. TV/Computer concerns: No., 01/06/2016  Nutrition/Health  Caffeine intake amount: coffee 1-2 cups in morning. Wants to lose weight: Yes. Sleeping concerns: Yes. Feels highly stressed: No., 03/02/2016  Type of diet: cardiac diet low salt. low salt cardiac diet, Wants to lose weight: Yes. Sleeping concerns: Yes.  Feels highly stressed: Yes., 01/06/2016  Substance Abuse - Denies Substance Abuse, 05/19/2015  Never, 02/06/2018  Tobacco  Former smoker, 02/25/2016  Former smoker, Cigarettes, 10 year(s). Started age 17.0 Years. Stopped age 28 Years. Household tobacco concerns: No., 06/03/2015  Family History  Acute myocardial infarction.: Father and Brother.  Brain aneurysm: Mother.  Cardiac arrest.: Brother.  Primary malignant neoplasm of prostate: Father.  Immunizations  Vaccine Date Status Comments   pneumococcal 23-polyvalent vaccine 02/03/2018 Given    tetanus/diphtheria/pertussis, acel(Tdap) 02/03/2018 Given other (see comment)   influenza virus vaccine, inactivated 01/06/2016 Given Other : ?NOT LATE

## 2022-05-02 NOTE — HISTORICAL [facility] CERNER
This is a historical note converted from [place]. Formatting and pictures may have been removed.  Please reference [place] for original formatting and attached multimedia. Chief Complaint  Pt is here to discuss bilateral knee surgery. Pt stated they both hurt the same.  History of Present Illness  Knee symptoms bilateral::?Both?knee gives way ??? Knee joint pain on the right?and left ??? Worse with weightbearing ??? Worse in the morning  ??? Slowly worsens with extended activity ??? Is increased by bending it ??? By twisting it ??? By kneeling ??? With stairs ??? By squatting  ??? Knee joint swelling on the right?and left?  ?? Knee joint pain not improved by rest ? ??? No clicking sensation in the right knee ????grating sensation in the right?and left knee?  ??? The knee did not suddenly buckle due to contact ?? No popping sound was heard in the knee?  ??? No tingling ??? No burning sensation  Review of Systems  Review of Systems?  ?????Constitutional: ?No fever, No chills. ?  ?????Respiratory: ?No shortness of breath, No cough. ?  ?????Cardiovascular: ?No chest pain. ?  ?????Gastrointestinal: ?No nausea, No vomiting, No diarrhea, No constipation, No heartburn. ?  ?????Genitourinary: ?No dysuria, No hematuria. ?  ?????Hematology/Lymphatics: ?No bleeding tendency. ?  ?????Endocrine: ?No polyuria. ?  ?????Neurologic: ?Alert and oriented X4, No numbness, No tingling. ?  ?????Psychiatric: ?No anxiety, No depression. ?  ?????Integumentary: no abnormalities except as noted in history of present illness  Physical Exam  Vitals & Measurements  HT:?170?cm? HT:?170?cm? WT:?105.2?kg? WT:?105.2?kg? BMI:?36.4?  PHYSICAL FINDINGS  Cardiovascular:  ? ?? Arterial Pulses: ? Dorsalis pedis pulses were normal right.? ? Dorsalis pedis pulses were normal left.  Musculoskeletal System:  ? ?? Hips:  ? ?? ?? ? General/bilateral: ? No swelling of the hips.? ? No induration of the hips.  ? ?? ?? ? Right Hip: ? Motion was normal.? ? No pain was  elicited by active internal rotation with the hip flexed.  ? ?? ?? ? ? No pain was elicited by active external rotation with the hip flexed.? ? No pain was elicited by active motion.? ? No pain was elicited by passive motion.  ? ?? ?? ? Left Hip: ? Motion was normal.  ? ?? Left and right Knee: ? Examined.? ??mild effusion.? ?Localized swelling.? ?Genu varum.? ?Patella demonstrated crepitus.? ?Anteromedial aspect was tender on palpation.? ?Medial aspect was tender on palpation.  patella  Left Knee:  Knee Motion:? ?? ?? Value???????????  ? Active flexion? ?? ?? [115] degrees  Active extension? ? [15] degrees  ?   left? and right knee ?? Pain was elicited throughout the range of motion.? ? Swelling with a negative fluctuation test.? ? No erythema.? ? No warmth.? ? Anterior aspect was not tender on palpation.? ? Patellofemoral region was not tender on palpation.? ? Medial collateral ligament was not tender on palpation.? ? Lateral collateral ligament was not tender on palpation.? ? No pain was elicited by motion using Jo apprehension test.? ? No laxity of the posterior cruciate ligament.? ? No anterior drawer sign was present.? ? No one plane medial (straight) instability.? ? No one plane lateral (straight) instability.? ? A Lachman test did not demonstrate one plane anterior instability.? ? Clarkes sign was not observed for chondromalacia.  ?   Right Knee:  Knee Motion:? ?? ? Value? ?? ?? ?? ?? Normal Range  Active flexion? ?? ? [110] degrees  Active extension? ?[10] degrees  ?   Foot:  ? ?? Left Foot: ? No excessive pronation.? ? No excessive supination.  Functional Exam:  ? ?? General/bilateral: ? Ambulation did not require a cane.? ? Ambulation did not require a walker.  Neurological:  ? ?? Sensation: ? No sensory exam abnormalities were noted.  ? ?? Motor: ? Strength was normal.? ? No weakness of the right hip was observed.? ? No weakness of the left hip was observed.  ? ?? Gait and Stance: ? A left-sided  antalgic gait was observed.  Skin:  Injury / Incision Site: ? Left knee showed no tissue injury scar.  ?  ?   TESTS  Imaging:  X-Ray Knee:  AP and lateral view x-rays of the left knee with sunrise view of the patella were performed -of left knee.  ?   IMPRESSIONS RADIOLOGY TEST  Narrowing of the left and right ?knee joint space bone on bone, showed sclerosis of the left? and right knee, and osteophytes arising from the left and right knee?knee.  This patient has been scheduled for bilateral total knee replacement.  ?? Discussion of orthopedic surgery limitations and risks, benefits, alternatives, and complications of operative and nonoperative treatments were discussed with patient and family. They understand, agree and want to proceed with operation/procedure  ??Discussion of orthopedic surgery limitations and risks: recurrence of problem, pain, deformity: problems with prosthesis, prosthesis dislocation, prosthesis loosening, prosthesis failure, problems with implanted hardware, loosening of implanted hardware, failure of implanted hardware, reaction of soft tissue to implanted hardware, protrusion of implanted hardware, pain from implanted hardware, stiffness, nerve injury, vascular injury, skin necrosis, tendon adhesion.  Assessment/Plan  Left knee pain  Right knee pain   Problem List/Past Medical History  Ongoing  Afib  Anxiety  Bursitis of left shoulder  CABG x 3 - Coronary artery bypass grafts x 3  CAD (coronary atherosclerotic disease)  CPAP (continuous positive airway pressure) ventilation weaning protocol  Diarrhea  Diverticulitis  Enlarged prostate  Gallstones  HLD (hyperlipidemia)  Hypertension  Obstructive sleep apnea of adult  Osteoarthritis  Osteoarthritis of left shoulder region  Osteoarthritis of right shoulder  Rotator cuff impingement syndrome of right shoulder  Sleep apnea  Historical  No qualifying data  Procedure/Surgical History  Replacement of Left Shoulder Joint with Synthetic Substitute,  Open Approach (02/26/2018)  Total Shoulder Arthroplasty (Left) (02/26/2018)  Colon Tattoo (02/09/2018)  Colonoscopy (02/09/2018)  Colonoscopy, flexible; with removal of tumor(s), polyp(s), or other lesion(s) by snare technique (02/09/2018)  Excision of Descending Colon, Via Natural or Artificial Opening Endoscopic, Diagnostic (02/09/2018)  Polypectomy (02/09/2018)  Replacement of Right Shoulder Joint with Synthetic Substitute, Open Approach (11/13/2017)  Total Shoulder Arthroplasty (Right) (11/13/2017)  Cholecystectomy Laparoscopic (.) (09/27/2016)  Resection of Gallbladder, Percutaneous Endoscopic Approach (09/27/2016)  Change Drainage Device in Peritoneal Cavity, External Approach (12/16/2015)  Fluoroscopy of Gallbladder using Low Osmolar Contrast (12/16/2015)  Drainage of Gallbladder with Drainage Device, Percutaneous Approach (12/15/2015)  (Aorto)coronary bypass of two coronary arteries (04/23/2015)  Angiocardiography of left heart structures (04/23/2015)  Bypass CABG (.) (04/23/2015)  Continuous invasive mechanical ventilation for less than 96 consecutive hours (04/23/2015)  Coronary arteriography using two catheters (04/23/2015)  Extracorporeal circulation auxiliary to open heart surgery (04/23/2015)  Implant of pulsation balloon (04/23/2015)  Intra Aortic Balloon Pump Insertion (04/23/2015)  Left heart cardiac catheterization (04/23/2015)  Left Heart Cath w/COR Angio Ventriculography (04/23/2015)  Single internal mammary-coronary artery bypass (04/23/2015)  Bilateral vasectomy for contraception (2007)  Cholecystectomy   Medications  amitriptyline 25 mg oral tablet, 50 mg= 2 tab(s), Oral, Once a day (at bedtime)  Colace 100 mg oral capsule, 200 mg= 2 cap(s), Oral, Daily,? ?Not taking  Lipitor 40 mg oral tablet, 40 mg= 1 tab(s), Oral, Daily, 6 refills  LISINOPRIL 2.5 MG TABLET, 1 tab, Oral, qAM,? ?Not taking  metoprolol tartrate 25 mg oral tab, 12.5 mg= 0.5 tab(s), Oral, BID, 6 refills  nitroglycerin 0.4 mg  sublingual TAB, 0.4 mg= 1 tab(s), SL, q5min, PRN  Paxil 20 mg oral tablet, 20 mg= 1 tab(s), Oral, Daily, 5 refills  Percocet 5/325 oral tablet, 1 tab(s), Oral, q8hr, PRN,? ?Not Taking, Completed Rx  Percocet 5/325 oral tablet, 1-2 tab(s), Oral, q4hr, PRN,? ?Not taking  Percocet 5/325 oral tablet, 1 tab(s), Oral, q6hr, PRN,? ?Not Taking, Completed Rx  TAMSULOSIN HCL 0.4 MG CAPSULE, 1 tab(s), Oral, Daily  Valium 5 mg oral tablet, 5 mg= 1 tab(s), Oral, q6hr, PRN,? ?Not taking  warfarin 2.5 mg oral tablet, 2.5 mg= 1 tab(s), Oral, At Bedtime, 1 refills  Allergies  No Known Allergies  Social History  Alcohol  Current, Wine, 1-2 times per year, Started age 22 Years. Previous treatment: None. Alcohol use interferes with work or home: No. Drinks more than intended: No. Others hurt by drinking: No. Ready to change: No. 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 Activity level: Heavy physical work. Highest education level: High school. Operates hazardous equipment: No. Workplace hazards: Heavy lifting/twisting., 01/06/2016  Exercise  Exercise frequency: Daily. Self assessment: Good condition. Exercise type: has an exercise machine., 06/03/2015  Home/Environment  Lives with Significant other. Living situation: Home/Independent. Home equipment: CPAP/BiPAP. Alcohol abuse in household: No. Substance abuse in household: No. Smoker in household: Yes. Injuries/Abuse/Neglect in household: No. Feels unsafe at home: No. Family/Friends available for support: Yes. Concern for family members at home: No. Major illness in household: No. Financial concerns: No. TV/Computer concerns: No., 01/06/2016  Nutrition/Health  Caffeine intake amount: coffee 1-2 cups in morning. Wants to lose weight: Yes. Sleeping concerns: Yes. Feels highly stressed: No., 03/02/2016  Type of diet: cardiac diet low salt. low salt cardiac diet, Wants to  lose weight: Yes. Sleeping concerns: Yes. Feels highly stressed: Yes., 01/06/2016  Substance Abuse - Denies Substance Abuse, 05/19/2015  Never, 02/06/2018  Tobacco  Former smoker, 02/25/2016  Former smoker, Cigarettes, 10 year(s). Started age 17.0 Years. Stopped age 28 Years. Household tobacco concerns: No., 06/03/2015  Family History  Acute myocardial infarction.: Father and Brother.  Brain aneurysm: Mother.  Cardiac arrest.: Brother.  Primary malignant neoplasm of prostate: Father.  Immunizations  Vaccine Date Status Comments   pneumococcal 23-polyvalent vaccine 02/03/2018 Given    tetanus/diphtheria/pertussis, acel(Tdap) 02/03/2018 Given other (see comment)   influenza virus vaccine, inactivated 01/06/2016 Given Other : ?NOT LATE   Health Maintenance  Health Maintenance  ???Pending?(in the next year)  ??? ??OverDue  ??? ? ? ?Coronary Artery Disease Maintenance-Lipid Lowering Therapy due??and every?  ??? ? ? ?Pneumococcal Vaccine due??and every?  ??? ? ? ?ADL Screening due??12/13/16??and every 1??year(s)  ??? ? ? ?HF-LVEF due??06/08/18??and every 2??year(s)  ??? ??Due?  ??? ? ? ?Abdominal Aortic Aneurysm Screening due??09/18/18??and every 100??year(s)  ??? ? ? ?Advance Directive due??09/18/18??and every 1??year(s)  ??? ? ? ?Alcohol Misuse Screening due??09/18/18??and every 1??year(s)  ??? ? ? ?Cognitive Screening due??09/18/18??and every 1??year(s)  ??? ? ? ?Functional Assessment due??09/18/18??and every 1??year(s)  ??? ? ? ?Geriatric Depression Screening due??09/18/18??and every 1??year(s)  ??? ? ? ?Zoster Vaccine due??09/18/18??and every 100??year(s)  ??? ??Due In Future?  ??? ? ? ?Fall Risk Assessment not due until??02/27/19??and every 1??year(s)  ??? ? ? ?Hypertension Management-Blood Pressure not due until??08/07/19??and every 1??year(s)  ??? ? ? ?HF-Heart Failure Education not due until??09/10/19??and every 1??year(s)  ??? ? ? ?Hypertension Management-BMP not due until??09/10/19??and every 1??year(s)  ??? ?  ? ?Aspirin Therapy for CVD Prevention not due until??09/10/19??and every 1??year(s)  ???Satisfied?(in the past 1 year)  ??? ??Satisfied?  ??? ? ? ?Aspirin Therapy for CVD Prevention on??09/10/18.??Satisfied by Britany Ruiz RN  ??? ? ? ?Blood Pressure Screening on??09/10/18.??Satisfied by Britany Ruiz RN  ??? ? ? ?Body Mass Index Check on??09/18/18.??Satisfied by Radha Johnson LPN  ??? ? ? ?Colorectal Screening ([facility]) on??02/09/18.  ??? ? ? ?Coronary Artery Disease Maintenance-Antiplatelet Agent Prescribed on??09/10/18.??Satisfied by Mat Chaparro MD  ??? ? ? ?Depression Screening on??09/18/18.??Satisfied by Radha Johnson LPN  ??? ? ? ?Diabetes Screening on??09/10/18.??Satisfied by Gladis Haines  ??? ? ? ?Fall Risk Assessment on??02/27/18.??Satisfied by Nicole Durand RN  ??? ? ? ?Hypertension Management-Blood Pressure on??09/10/18.??Satisfied by Britany Ruiz RN  ??? ? ? ?Influenza Vaccine on??11/27/17.??Satisfied by Suzanne Tucker RN  ??? ? ? ?Lipid Screening on??08/09/18.??Satisfied by Wilian Perez  ??? ? ? ?Obesity Screening on??09/18/18.??Satisfied by Radha Johnson LPN  ??? ? ? ?Pneumococcal Vaccine on??02/03/18.??Satisfied by Cait Novoa LPN  ??? ? ? ?Smoking Cessation (Coronary Artery Disease) on??02/01/18.??Satisfied by Torie Reeves RN  ??? ? ? ?Tetanus Vaccine on??02/03/18.??Satisfied by Cait Novoa LPN  ?  ?      Diagnosis: Right and left knee osteoarthritis end-stage  ?  Plan: Patient?is on an anticoagulant and is unable to use anti-inflammatories for his arthritic conditions.  Patient has had numerous cortisone injections to his shoulders he does not wish to go down the street in the past with his knees as a give have given him limited?results and his other arthritic joints in the past.  ?  At this point due to the patients end-stage arthritis limitations with range of motion and functionality with active today living he is elected to  have? bilateral total knee arthroplasty

## 2022-05-02 NOTE — HISTORICAL [facility] CERNER
This is a historical note converted from [place]. Formatting and pictures may have been removed.  Please reference [place] for original formatting and attached multimedia. Chief Complaint  S/P RIGHT SHOULDER REPLACEMENT ON 11/13/17. STARTED P.T. WEARING A SLING.  History of Present Illness  She has a healing surgical wound on his right shoulder is not having significant pain?at rest is just really started therapy?this week because for the first week he was not approved by insurance  Physical Exam  Vitals & Measurements  BP:?115/77?  HT:?170.18?cm? HT:?170.16?cm? WT:?95.6?kg? WT:?95.6?kg? BMI:?33.02?  Examination the patient has a little bit tenderness over his wound his range of motion is markedly improving he can go up to 100? now for flexion?with?assistance.? His x-rays show a well-seated humeral and glenoid component with good positioning.  ?  We will continue physical therapy and have him return to see me in 1 month  Assessment/Plan  Status post total shoulder replacement  Ordered:  Clinic Follow up, *Est. 01/09/18 9:30:00 CST, Order for future visit, Status post total shoulder replacement, [facility]  Post-Op follow-up visit [id number] , Status post total shoulder replacement, [facility], 11/30/17 9:36:00 CST  ?  Orders:  acetaminophen-oxyCODONE, 1 tab(s), Oral, q6hr, PRN PRN pain, # 50 tab(s), 0 Refill(s), Pharmacy: [id number]   Problem List/Past Medical History  Ongoing  Afib  Anxiety  Bursitis of left shoulder  CABG x 3 - Coronary artery bypass grafts x 3  CAD (coronary atherosclerotic disease)  CPAP (continuous positive airway pressure) ventilation weaning protocol  Enlarged prostate  Gallstones  HLD (hyperlipidemia)  Hypertension  Obstructive sleep apnea of adult  Osteoarthritis  Osteoarthritis of right shoulder  Rotator cuff impingement syndrome of right shoulder  Sleep apnea  Historical  Procedure/Surgical History  Replacement of Right Shoulder Joint with Synthetic Substitute,  Open Approach (11/13/2017)  Total Shoulder Arthroplasty (Right) (11/13/2017)  Cholecystectomy Laparoscopic (.) (09/27/2016)  Resection of Gallbladder, Percutaneous Endoscopic Approach (09/27/2016)  Change Drainage Device in Peritoneal Cavity, External Approach (12/16/2015)  Fluoroscopy of Gallbladder using Low Osmolar Contrast (12/16/2015)  Drainage of Gallbladder with Drainage Device, Percutaneous Approach (12/15/2015)  (Aorto)coronary bypass of two coronary arteries (04/23/2015)  Angiocardiography of left heart structures (04/23/2015)  Bypass CABG (.) (04/23/2015)  Continuous invasive mechanical ventilation for less than 96 consecutive hours (04/23/2015)  Coronary arteriography using two catheters (04/23/2015)  Extracorporeal circulation auxiliary to open heart surgery (04/23/2015)  Implant of pulsation balloon (04/23/2015)  Intra Aortic Balloon Pump Insertion (04/23/2015)  Left heart cardiac catheterization (04/23/2015)  Left Heart Cath w/COR Angio Ventriculography (04/23/2015)  Single internal mammary-coronary artery bypass (04/23/2015)  Bilateral vasectomy for contraception (2007)  Medications  AMITRIPTYLIN TAB 25MG, 25 mg= 1 tab(s), Oral, qPM,? ?Not taking  Colace 100 mg oral capsule, 200 mg= 2 cap(s), Oral, Daily,? ?Not taking  enoxaparin 40 mg/0.4 mL subcutaneous solution, 40 mg, Subcutaneous, Daily  Lipitor 40 mg oral tablet, 40 mg= 1 tab(s), Oral, Daily, 6 refills  LISINOPRIL 2.5 MG TABLET  metoprolol tartrate 25 mg oral tab, 12.5 mg= 0.5 tab(s), Oral, BID, 6 refills  Paxil 20 mg oral tablet, 20 mg= 1 tab(s), Oral, Daily, 5 refills  Percocet 5/325 oral tablet, 1 tab(s), Oral, q6hr, PRN  Percocet 5/325 oral tablet, 1-2 tab(s), Oral, q4hr, PRN  TAMSULOSIN HCL 0.4 MG CAPSULE, 1 tab(s), Oral, Daily  Valium 5 mg oral tablet, 5 mg= 1 tab(s), Oral, q6hr, PRN  warfarin 2.5 mg oral tablet, 2.5 mg= 1 tab(s), Oral, At Bedtime, 1 refills,? ?Not taking  Allergies  No Known Allergies  Social History  Alcohol -  03/02/2016  Current, Wine, 1-2 times per year, Started age 22 Years. Previous treatment: None. Alcohol use interferes with work or home: No. Drinks more than intended: No. Others hurt by drinking: No. Ready to change: No. Household alcohol concerns: No.  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 Activity level: Heavy physical work. Highest education level: High school. Operates hazardous equipment: No. Workplace hazards: Heavy lifting/twisting.  Exercise - 03/02/2016  Exercise frequency: Daily. Self assessment: Good condition. Exercise type: has an exercise machine.  Home/Environment - 03/02/2016  Lives with Significant other. Living situation: Home/Independent. Home equipment: CPAP/BiPAP. Alcohol abuse in household: No. Substance abuse in household: No. Smoker in household: Yes. Injuries/Abuse/Neglect in household: No. Feels unsafe at home: No. Family/Friends available for support: Yes. Concern for family members at home: No. Major illness in household: No. Financial concerns: No. TV/Computer concerns: No.  Nutrition/Health - 03/02/2016  Caffeine intake amount: coffee 1-2 cups in morning. Wants to lose weight: Yes. Sleeping concerns: Yes. Feels highly stressed: No.  Type of diet: cardiac diet low salt. low salt cardiac diet, Wants to lose weight: Yes. Sleeping concerns: Yes. Feels highly stressed: Yes.  Substance Abuse - Denies Substance Abuse, 03/02/2016  Tobacco - 03/02/2016  Former smoker  Former smoker, Cigarettes, 10 year(s). Started age 17.0 Years. Stopped age 28 Years. Household tobacco concerns: No.  Family History  Acute myocardial infarction.: Father and Brother.  Brain aneurysm: Mother.  Cardiac arrest.: Brother.  Primary malignant neoplasm of prostate: Father.

## 2022-05-02 NOTE — HISTORICAL [facility]ER
This is a historical note converted from [place]. Formatting and pictures may have been removed.  Please reference [place] for original formatting and attached multimedia. Chief Complaint  PATIENT HERE FOR RIGHT SHOULDER PAIN. PATIENT WAS SEEING DR HADDAD  History of Present Illness  Pain getting WORST on the right ?? Pain right Shoulder laterally ?? No fever ?? No chills  ?? Pain in the right shoulder in the subacromial region ?? In the supraspinatus region ?? When actively and passively moved ?? ?? Started gradually ?? Slowly worsens with extended activity ?? Worsens at night ?? Causing an inability to sleep ?? Causes difficulty lying on affected side ?? Feels better after rest ?? Not relieved by medication ?? Shoulder joint stiffness on the right ?? joint stiffness ???? No radicular arm pain ?? No right sternoclavicular joint pain ?? No right shoulder joint swelling ?? No pain in the acromioclavicular  ? ?? No tingling of the right shoulder ?? No right shoulder numbness  ?? Range of motion was abnormal difficulty reaching over head using right arm ?? Range of motion was abnormal difficulty combing hair using right arm ?? Range of motion was abnormal difficulty taking shirt on/off using right arm  ?? No neck pain on right Percervical pain ?? Not in the trapezius Trapezius pain  Pain is significantly effecting quality of life  Review of Systems  Review of Systems  ????Constitutional: No fever, No chills.  ????Respiratory: No shortness of breath, No cough.  ????Cardiovascular: No chest pain.  ????Gastrointestinal: No nausea, No vomiting, No diarrhea, No constipation, No heartburn.  ????Genitourinary: No dysuria, No hematuria.  ????Hematology/Lymphatics: No bleeding tendency.  ????Endocrine: No polyuria.  ????Neurologic: Alert and oriented X4, No numbness, No tingling.  ????Psychiatric: No anxiety, No depression.  Physical Exam  Vitals & Measurements  HR:?68?(Apical)? BP:?145/87?  HT:?170.18?cm? HT:?170.18?cm?  WT:?95.6?kg? WT:?95.6?kg? BMI:?33.01?  ??????  ???  PHYSICAL FINDING  ??  Neck:  ??NOTED ?Pain radiating to the shoulder Percervical  ???  Musculoskeletal:  ? NO swelling of the right acromioclavicular joint.  ? NO swelling of the left acromioclavicular joint.  ???  General Appearance:  ? In NO acute distress.  ???  Eyes:  General/bilateral:  Sclera: ? Normal.  ???  Ears:  General/bilateral:  Outer Ear: ? Normal.  ???  Lungs:  ? Respiratory excursion normal and symmetric.  ???  Cardiovascular:  Heart Rate and Rhythm: ? Normal.  Arterial Pulses: ? Ulnar pulses 1+ right. ? Radial pulse 1+ right. ? Radial pulse 1+ left.  ???  Abdomen:  ? Normal.  ???  ???  Musculoskeletal System:  ???  Shoulder:  ?.  ?? atrophy of the deltoid muscle  ?? atrophy of the supraspinatus muscle  ? NO atrophy of the infraspinatus muscle  ? NO pain was elicited during a lift-off test of the shoulder?  ???  Right Shoulder: ?. ? Acromial tenderness on palpation. ? Tenderness on palpation of the subacromial bursa. ? Tenderness on palpation of the deltoid muscle. ? Tenderness on palpation of the supraspinatus muscle. ? Tenderness on palpation of the infraspinatus muscle. ? Tenderness on palpation of the glenohumeral joint region. ? Tenderness on palpation at the bicipital groove. ? Tenderness on palpation of the trapezius muscle. ? Subacromial crepitus on motion was noted.  ???  ?   Right Shoulder:  ???  Shoulder Motion:??????????????Value???? ????Normal Range  ???  Active abduction ?????????????? 100 degrees  Active forward flexion???????????????110 degrees  Active external rotation?????????????? 30 degrees  Active?internal rotation?????????????? 30 degrees  ???  ? NO swelling medial sternoclavicular.  ? NO swelling.  ? NO induration.  ? NO erythema.  ? NO long head biceps deformity.  ? NO winged scapula.  ?   ? Motion was normal.  ?? pain was elicited during a Neer impingement test.  ?  ?   Clavicle:  ???  General/bilateral: ? Acromioclavicular  joint showed NO pain elicited by motion distal right.  ???  Right Clavicle: ? Right sternoclavicular joint showed tenderness on palpation. ? Right acromioclavicular joint showed tenderness on palpation.  ???  Left Clavicle: ? Left sternoclavicular joint showed tenderness on palpation. ? Left acromioclavicular joint showed tenderness on palpation.  ???  ???  Neurological:  ???  ? NO abnormalities were noted Sensibility intact distally on right.  ? Oriented to time, place, and person.  ???  Sensation:  ? NO sensory exam abnormalities were noted Decreased median sensation on right.  ? NO sensory exam abnormalities were noted Decreased ulnar sensation on right.  ? NO sensory exam abnormalities were noted Decreased radial sensation on right.  ???  Motor (Strength):  ?? weakness of the right shoulder was observed.  ?   ??????????????????????????????????????????????????????????????????????????????  Skin:  ? NO ecchymosis on the shoulders left.  ? NO ecchymosis on the shoulders right.  ???  Injury / Incision Site: ? NO scar.  ???  TESTS  ???  Imaging:  ???  X-Ray Shoulder:?Complete, two or more views of the true AP of the glenohumeral joint were performed??????????????????????????????????????????????????????????????????????????????????  ?  ?   radiographic evidence of any osteoarticular abnormality??with bone on bone at glenohuneral joint ??????????????????????????????????????????????????????????????????????????????????????????????????????????????????????????????????????????????????????????  .  ??????  ???  ASSESSMENT  ?  ? Localized primary osteoarthritis of the right shoulder  ?  ???  PLAN  ?  ? RIGHT Shoulder arthroplasty  ?  ? Physical therapy service  ? Home exercises  ?  will need right TSR  needs to see his cardiologist for clearance  ?  Assessment/Plan  1.?Osteoarthritis of right shoulder  Right shoulder pain   Problem List/Past Medical History  Ongoing  Afib  Anxiety  Bursitis of left shoulder  CABG x 3 - Coronary  artery bypass grafts x 3  CAD (coronary atherosclerotic disease)  CPAP (continuous positive airway pressure) ventilation weaning protocol  Gallstones  HLD (hyperlipidemia)  Hypertension  Obstructive sleep apnea of adult  Osteoarthritis  Osteoarthritis of right shoulder  Rotator cuff impingement syndrome of right shoulder  Sleep apnea  Historical  Myocardial infarction  Procedure/Surgical History  Cholecystectomy Laparoscopic (.) (09/27/2016)  Resection of Gallbladder, Percutaneous Endoscopic Approach (09/27/2016)  Change Drainage Device in Peritoneal Cavity, External Approach (12/16/2015)  Fluoroscopy of Gallbladder using Low Osmolar Contrast (12/16/2015)  Drainage of Gallbladder with Drainage Device, Percutaneous Approach (12/15/2015)  (Aorto)coronary bypass of two coronary arteries (04/23/2015)  Angiocardiography of left heart structures (04/23/2015)  Bypass CABG (.) (04/23/2015)  Continuous invasive mechanical ventilation for less than 96 consecutive hours (04/23/2015)  Coronary arteriography using two catheters (04/23/2015)  Extracorporeal circulation auxiliary to open heart surgery (04/23/2015)  Implant of pulsation balloon (04/23/2015)  Intra Aortic Balloon Pump Insertion (04/23/2015)  Left heart cardiac catheterization (04/23/2015)  Left Heart Cath w/COR Angio Ventriculography (04/23/2015)  Single internal mammary-coronary artery bypass (04/23/2015)  Bilateral vasectomy for contraception (2007)  Medications  AMITRIPTYLIN TAB 25MG, 25 mg= 1 tab(s), Oral, qPM  HYDROCODON-ACETAMINOPH 7.5-325,? ?Not taking  Lipitor 40 mg oral tablet, 40 mg= 1 tab(s), Oral, Daily, 6 refills  LISINOPRIL 2.5 MG TABLET  METOPROLOL SUCC ER 25 MG TAB  metoprolol tartrate 25 mg oral tab, 12.5 mg= 0.5 tab(s), Oral, BID, 6 refills,? ?Not taking  Paxil 20 mg oral tablet, 20 mg= 1 tab(s), Oral, Daily, 5 refills  TAMSULOSIN HCL 0.4 MG CAPSULE  TRAZODONE 50 MG TABLET,? ?Not taking  warfarin 2.5 mg oral tablet, 2.5 mg= 1 tab(s), Oral, At  Bedtime, 1 refills  Allergies  No Known Allergies  Social History  Alcohol - 03/02/2016  Current, Wine, 1-2 times per year, Started age 22 Years. Previous treatment: None. Alcohol use interferes with work or home: No. Drinks more than intended: No. Others hurt by drinking: No. Ready to change: No. Household alcohol concerns: No.  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 Activity level: Heavy physical work. Highest education level: High school. Operates hazardous equipment: No. Workplace hazards: Heavy lifting/twisting.  Exercise - 03/02/2016  Exercise frequency: Daily. Self assessment: Good condition. Exercise type: has an exercise machine.  Home/Environment - 03/02/2016  Lives with Significant other. Living situation: Home/Independent. Home equipment: CPAP/BiPAP. Alcohol abuse in household: No. Substance abuse in household: No. Smoker in household: Yes. Injuries/Abuse/Neglect in household: No. Feels unsafe at home: No. Family/Friends available for support: Yes. Concern for family members at home: No. Major illness in household: No. Financial concerns: No. TV/Computer concerns: No.  Nutrition/Health - 03/02/2016  Caffeine intake amount: coffee 1-2 cups in morning. Wants to lose weight: Yes. Sleeping concerns: Yes. Feels highly stressed: No.  Type of diet: cardiac diet low salt. low salt cardiac diet, Wants to lose weight: Yes. Sleeping concerns: Yes. Feels highly stressed: Yes.  Substance Abuse - Denies Substance Abuse, 03/02/2016  Tobacco - 03/02/2016  Former smoker  Former smoker, Cigarettes, 10 year(s). Started age 17.0 Years. Stopped age 28 Years. Household tobacco concerns: No.  Family History  Acute myocardial infarction.: Father and Brother.  Brain aneurysm: Mother.  Cardiac arrest.: Brother.  Primary malignant neoplasm of prostate: Father.

## 2022-05-04 ENCOUNTER — LAB VISIT (OUTPATIENT)
Dept: LAB | Facility: HOSPITAL | Age: 72
End: 2022-05-04
Attending: FAMILY MEDICINE
Payer: MEDICARE

## 2022-05-04 DIAGNOSIS — R93.89 ABNORMAL FINDINGS ON DIAGNOSTIC IMAGING OF OTHER SPECIFIED BODY STRUCTURES: ICD-10-CM

## 2022-05-04 DIAGNOSIS — Z12.5 ENCOUNTER FOR SCREENING FOR MALIGNANT NEOPLASM OF PROSTATE: ICD-10-CM

## 2022-05-04 DIAGNOSIS — R93.3 ABNORMAL FINDINGS ON DIAGNOSTIC IMAGING OF OTHER PARTS OF DIGESTIVE TRACT: ICD-10-CM

## 2022-05-04 DIAGNOSIS — R94.120 ABNORMAL AUDITORY FUNCTION STUDY: ICD-10-CM

## 2022-05-04 DIAGNOSIS — Z00.00 GENERAL MEDICAL EXAM: Primary | ICD-10-CM

## 2022-05-04 LAB
ALBUMIN SERPL-MCNC: 4.2 GM/DL (ref 3.4–4.8)
ALBUMIN/GLOB SERPL: 1.2 RATIO (ref 1.1–2)
ALP SERPL-CCNC: 60 UNIT/L (ref 40–150)
ALT SERPL-CCNC: 16 UNIT/L (ref 0–55)
APPEARANCE UR: CLEAR
AST SERPL-CCNC: 18 UNIT/L (ref 5–34)
BACTERIA #/AREA URNS AUTO: NORMAL /HPF
BILIRUB UR QL STRIP.AUTO: NEGATIVE MG/DL
BILIRUBIN DIRECT+TOT PNL SERPL-MCNC: 0.5 MG/DL (ref 0–0.5)
BILIRUBIN DIRECT+TOT PNL SERPL-MCNC: 0.6 MG/DL (ref 0–0.8)
BILIRUBIN DIRECT+TOT PNL SERPL-MCNC: 1.1 MG/DL
BUN SERPL-MCNC: 13.8 MG/DL (ref 8.4–25.7)
CALCIUM SERPL-MCNC: 9.5 MG/DL (ref 8.8–10)
CHLORIDE SERPL-SCNC: 106 MMOL/L (ref 98–107)
CHOLEST SERPL-MCNC: 113 MG/DL
CHOLEST/HDLC SERPL: 3 {RATIO} (ref 0–5)
CO2 SERPL-SCNC: 27 MMOL/L (ref 23–31)
COLOR UR AUTO: YELLOW
CREAT SERPL-MCNC: 0.83 MG/DL (ref 0.73–1.18)
ERYTHROCYTE [DISTWIDTH] IN BLOOD BY AUTOMATED COUNT: 13.2 % (ref 11.5–17)
GLOBULIN SER-MCNC: 3.5 GM/DL (ref 2.4–3.5)
GLUCOSE SERPL-MCNC: 86 MG/DL (ref 82–115)
GLUCOSE UR QL STRIP.AUTO: NEGATIVE MG/DL
HBV SURFACE AG SERPL QL IA: NONREACTIVE
HCT VFR BLD AUTO: 47.9 % (ref 42–52)
HCV AB SERPL QL IA: NONREACTIVE
HDLC SERPL-MCNC: 39 MG/DL (ref 35–60)
HGB BLD-MCNC: 14.9 GM/DL (ref 14–18)
KETONES UR QL STRIP.AUTO: NEGATIVE MG/DL
LDLC SERPL CALC-MCNC: 56 MG/DL (ref 50–140)
LEUKOCYTE ESTERASE UR QL STRIP.AUTO: NEGATIVE UNIT/L
MCH RBC QN AUTO: 30.1 PG (ref 27–31)
MCHC RBC AUTO-ENTMCNC: 31.1 MG/DL (ref 33–36)
MCV RBC AUTO: 96.8 FL (ref 80–94)
NITRITE UR QL STRIP.AUTO: NEGATIVE
PH UR STRIP.AUTO: 6 [PH]
PLATELET # BLD AUTO: 277 X10(3)/MCL (ref 130–400)
PMV BLD AUTO: 11.2 FL (ref 9.4–12.4)
POTASSIUM SERPL-SCNC: 4.9 MMOL/L (ref 3.5–5.1)
PROT SERPL-MCNC: 7.7 GM/DL (ref 5.8–7.6)
PROT UR QL STRIP.AUTO: NEGATIVE MG/DL
PSA SERPL-MCNC: 0.48 NG/ML
RBC # BLD AUTO: 4.95 X10(6)/MCL (ref 4.7–6.1)
RBC #/AREA URNS AUTO: NORMAL /HPF
RBC UR QL AUTO: ABNORMAL UNIT/L
SODIUM SERPL-SCNC: 144 MMOL/L (ref 136–145)
SP GR UR STRIP.AUTO: 1.02
SQUAMOUS #/AREA URNS AUTO: NORMAL /LPF
TRIGL SERPL-MCNC: 89 MG/DL (ref 34–140)
TSH SERPL-ACNC: 1.94 UIU/ML (ref 0.35–4.94)
UROBILINOGEN UR STRIP-ACNC: 0.2 MG/DL
VLDLC SERPL CALC-MCNC: 18 MG/DL
WBC # SPEC AUTO: 8.1 X10(3)/MCL (ref 4.5–11.5)
WBC #/AREA URNS AUTO: NORMAL /HPF

## 2022-05-04 PROCEDURE — 84443 ASSAY THYROID STIM HORMONE: CPT

## 2022-05-04 PROCEDURE — 87340 HEPATITIS B SURFACE AG IA: CPT

## 2022-05-04 PROCEDURE — 80061 LIPID PANEL: CPT

## 2022-05-04 PROCEDURE — 85027 COMPLETE CBC AUTOMATED: CPT

## 2022-05-04 PROCEDURE — 80053 COMPREHEN METABOLIC PANEL: CPT

## 2022-05-04 PROCEDURE — 36415 COLL VENOUS BLD VENIPUNCTURE: CPT

## 2022-05-04 PROCEDURE — 84153 ASSAY OF PSA TOTAL: CPT

## 2022-05-04 PROCEDURE — 81001 URINALYSIS AUTO W/SCOPE: CPT

## 2022-05-04 PROCEDURE — 81003 URINALYSIS AUTO W/O SCOPE: CPT

## 2022-05-04 PROCEDURE — 86803 HEPATITIS C AB TEST: CPT

## 2022-05-09 DIAGNOSIS — I48.91 ATRIAL FIBRILLATION: Primary | ICD-10-CM

## 2022-05-10 ENCOUNTER — ANTI-COAG VISIT (OUTPATIENT)
Dept: CARDIOLOGY | Facility: HOSPITAL | Age: 72
End: 2022-05-10
Payer: MEDICARE

## 2022-05-10 DIAGNOSIS — I48.91 ATRIAL FIBRILLATION, UNSPECIFIED TYPE: ICD-10-CM

## 2022-05-10 LAB — INR PPP: 2.3 (ref 2–3)

## 2022-05-10 PROCEDURE — 99211 OFF/OP EST MAY X REQ PHY/QHP: CPT

## 2022-05-10 PROCEDURE — 85610 PROTHROMBIN TIME: CPT

## 2022-06-09 ENCOUNTER — ANTI-COAG VISIT (OUTPATIENT)
Dept: CARDIOLOGY | Facility: HOSPITAL | Age: 72
End: 2022-06-09
Payer: MEDICAID

## 2022-06-09 LAB — INR PPP: 2.2 (ref 2–3)

## 2022-06-09 PROCEDURE — 99211 OFF/OP EST MAY X REQ PHY/QHP: CPT

## 2022-06-09 PROCEDURE — 85610 PROTHROMBIN TIME: CPT

## 2022-06-21 DIAGNOSIS — I48.91 A-FIB: Primary | ICD-10-CM

## 2022-07-07 ENCOUNTER — ANTI-COAG VISIT (OUTPATIENT)
Dept: CARDIOLOGY | Facility: HOSPITAL | Age: 72
End: 2022-07-07
Payer: MEDICARE

## 2022-07-07 DIAGNOSIS — I48.91 A-FIB: ICD-10-CM

## 2022-07-07 DIAGNOSIS — I48.91 ATRIAL FIBRILLATION, UNSPECIFIED TYPE: ICD-10-CM

## 2022-07-07 LAB — INR PPP: 2.3 (ref 2–3)

## 2022-07-07 PROCEDURE — 99211 OFF/OP EST MAY X REQ PHY/QHP: CPT

## 2022-07-07 PROCEDURE — 85610 PROTHROMBIN TIME: CPT

## 2022-08-04 ENCOUNTER — ANTI-COAG VISIT (OUTPATIENT)
Dept: CARDIOLOGY | Facility: HOSPITAL | Age: 72
End: 2022-08-04
Payer: MEDICARE

## 2022-08-04 DIAGNOSIS — I48.91 A-FIB: ICD-10-CM

## 2022-08-04 DIAGNOSIS — I48.91 ATRIAL FIBRILLATION, UNSPECIFIED TYPE: ICD-10-CM

## 2022-08-04 LAB — INR PPP: 3 (ref 2–3)

## 2022-08-04 PROCEDURE — 85610 PROTHROMBIN TIME: CPT

## 2022-08-04 PROCEDURE — 99211 OFF/OP EST MAY X REQ PHY/QHP: CPT

## 2022-09-08 ENCOUNTER — ANTI-COAG VISIT (OUTPATIENT)
Dept: CARDIOLOGY | Facility: HOSPITAL | Age: 72
End: 2022-09-08
Payer: MEDICARE

## 2022-09-08 DIAGNOSIS — I48.91 A-FIB: ICD-10-CM

## 2022-09-08 LAB — INR PPP: 2.9 (ref 2–3)

## 2022-09-08 PROCEDURE — 99211 OFF/OP EST MAY X REQ PHY/QHP: CPT | Mod: 27

## 2022-09-08 PROCEDURE — 85610 PROTHROMBIN TIME: CPT

## 2022-10-20 ENCOUNTER — ANTI-COAG VISIT (OUTPATIENT)
Dept: CARDIOLOGY | Facility: HOSPITAL | Age: 72
End: 2022-10-20
Payer: MEDICARE

## 2022-10-20 DIAGNOSIS — I48.91 A-FIB: ICD-10-CM

## 2022-10-20 DIAGNOSIS — I48.91 ATRIAL FIBRILLATION, UNSPECIFIED TYPE: Primary | ICD-10-CM

## 2022-10-20 LAB — INR PPP: 4 (ref 2–3)

## 2022-10-20 PROCEDURE — 99211 OFF/OP EST MAY X REQ PHY/QHP: CPT

## 2022-10-20 PROCEDURE — 85610 PROTHROMBIN TIME: CPT

## 2022-11-17 ENCOUNTER — ANTI-COAG VISIT (OUTPATIENT)
Dept: CARDIOLOGY | Facility: HOSPITAL | Age: 72
End: 2022-11-17
Payer: MEDICARE

## 2022-11-17 DIAGNOSIS — I48.91 A-FIB: ICD-10-CM

## 2022-11-17 LAB — INR PPP: 4.6 (ref 2–3)

## 2022-11-17 PROCEDURE — 85610 PROTHROMBIN TIME: CPT

## 2022-11-17 PROCEDURE — 99211 OFF/OP EST MAY X REQ PHY/QHP: CPT

## 2023-05-30 ENCOUNTER — CLINICAL SUPPORT (OUTPATIENT)
Dept: RESPIRATORY THERAPY | Facility: HOSPITAL | Age: 73
End: 2023-05-30
Attending: SURGERY
Payer: MEDICARE

## 2023-05-30 ENCOUNTER — LAB VISIT (OUTPATIENT)
Dept: LAB | Facility: HOSPITAL | Age: 73
End: 2023-05-30
Attending: SURGERY
Payer: MEDICARE

## 2023-05-30 DIAGNOSIS — Z86.010 HX OF ADENOMATOUS COLONIC POLYPS: Primary | ICD-10-CM

## 2023-05-30 DIAGNOSIS — Z01.818 PRE-OP EVALUATION: Primary | ICD-10-CM

## 2023-05-30 DIAGNOSIS — Z01.818 PRE-OP EVALUATION: ICD-10-CM

## 2023-05-30 DIAGNOSIS — R79.1 ABNORMAL COAGULATION PROFILE: ICD-10-CM

## 2023-05-30 DIAGNOSIS — R94.5 ABNORMAL RESULTS OF LIVER FUNCTION STUDIES: ICD-10-CM

## 2023-05-30 LAB
ALBUMIN SERPL-MCNC: 4 G/DL (ref 3.4–4.8)
ALBUMIN/GLOB SERPL: 1.3 RATIO (ref 1.1–2)
ALP SERPL-CCNC: 57 UNIT/L (ref 40–150)
ALT SERPL-CCNC: 11 UNIT/L (ref 0–55)
APTT PPP: 37.8 SECONDS (ref 23.2–33.7)
AST SERPL-CCNC: 13 UNIT/L (ref 5–34)
BASOPHILS # BLD AUTO: 0.07 X10(3)/MCL
BASOPHILS NFR BLD AUTO: 0.8 %
BILIRUBIN DIRECT+TOT PNL SERPL-MCNC: 0.6 MG/DL
BUN SERPL-MCNC: 16 MG/DL (ref 8.4–25.7)
CALCIUM SERPL-MCNC: 9.2 MG/DL (ref 8.8–10)
CHLORIDE SERPL-SCNC: 105 MMOL/L (ref 98–107)
CO2 SERPL-SCNC: 26 MMOL/L (ref 23–31)
CREAT SERPL-MCNC: 0.95 MG/DL (ref 0.73–1.18)
EOSINOPHIL # BLD AUTO: 0.15 X10(3)/MCL (ref 0–0.9)
EOSINOPHIL NFR BLD AUTO: 1.7 %
ERYTHROCYTE [DISTWIDTH] IN BLOOD BY AUTOMATED COUNT: 13.5 % (ref 11.5–17)
GFR SERPLBLD CREATININE-BSD FMLA CKD-EPI: >60 MLS/MIN/1.73/M2
GLOBULIN SER-MCNC: 3 GM/DL (ref 2.4–3.5)
GLUCOSE SERPL-MCNC: 93 MG/DL (ref 82–115)
HCT VFR BLD AUTO: 41.9 % (ref 42–52)
HGB BLD-MCNC: 13.4 G/DL (ref 14–18)
IMM GRANULOCYTES # BLD AUTO: 0.02 X10(3)/MCL (ref 0–0.04)
IMM GRANULOCYTES NFR BLD AUTO: 0.2 %
INR BLD: 1.34 (ref 0–1.3)
LYMPHOCYTES # BLD AUTO: 2.04 X10(3)/MCL (ref 0.6–4.6)
LYMPHOCYTES NFR BLD AUTO: 22.6 %
MCH RBC QN AUTO: 31.5 PG (ref 27–31)
MCHC RBC AUTO-ENTMCNC: 32 G/DL (ref 33–36)
MCV RBC AUTO: 98.4 FL (ref 80–94)
MONOCYTES # BLD AUTO: 0.97 X10(3)/MCL (ref 0.1–1.3)
MONOCYTES NFR BLD AUTO: 10.8 %
NEUTROPHILS # BLD AUTO: 5.77 X10(3)/MCL (ref 2.1–9.2)
NEUTROPHILS NFR BLD AUTO: 63.9 %
PLATELET # BLD AUTO: 231 X10(3)/MCL (ref 130–400)
PMV BLD AUTO: 10.8 FL (ref 7.4–10.4)
POTASSIUM SERPL-SCNC: 4 MMOL/L (ref 3.5–5.1)
PROT SERPL-MCNC: 7 GM/DL (ref 5.8–7.6)
PROTHROMBIN TIME: 16.9 SECONDS (ref 12.5–14.5)
RBC # BLD AUTO: 4.26 X10(6)/MCL (ref 4.7–6.1)
SODIUM SERPL-SCNC: 139 MMOL/L (ref 136–145)
WBC # SPEC AUTO: 9.02 X10(3)/MCL (ref 4.5–11.5)

## 2023-05-30 PROCEDURE — 80053 COMPREHEN METABOLIC PANEL: CPT

## 2023-05-30 PROCEDURE — 36415 COLL VENOUS BLD VENIPUNCTURE: CPT

## 2023-05-30 PROCEDURE — 85610 PROTHROMBIN TIME: CPT

## 2023-05-30 PROCEDURE — 93010 EKG 12-LEAD: ICD-10-PCS | Mod: ,,, | Performed by: INTERNAL MEDICINE

## 2023-05-30 PROCEDURE — 85025 COMPLETE CBC W/AUTO DIFF WBC: CPT

## 2023-05-30 PROCEDURE — 93010 ELECTROCARDIOGRAM REPORT: CPT | Mod: ,,, | Performed by: INTERNAL MEDICINE

## 2023-05-30 PROCEDURE — 85730 THROMBOPLASTIN TIME PARTIAL: CPT

## 2023-05-30 PROCEDURE — 93005 ELECTROCARDIOGRAM TRACING: CPT

## 2023-05-30 RX ORDER — PAROXETINE HYDROCHLORIDE 20 MG/1
20 TABLET, FILM COATED ORAL EVERY MORNING
COMMUNITY
Start: 2023-04-28

## 2023-05-30 RX ORDER — LACTULOSE 10 G/15ML
15 SOLUTION ORAL DAILY PRN
COMMUNITY
Start: 2023-05-09

## 2023-05-30 RX ORDER — METOPROLOL SUCCINATE 25 MG/1
12.5 TABLET, EXTENDED RELEASE ORAL EVERY MORNING
COMMUNITY
Start: 2023-03-20

## 2023-05-30 RX ORDER — QUETIAPINE FUMARATE 100 MG/1
100 TABLET, FILM COATED ORAL NIGHTLY
COMMUNITY
Start: 2023-05-22

## 2023-05-30 RX ORDER — LORATADINE 10 MG/1
10 TABLET ORAL NIGHTLY
COMMUNITY
Start: 2023-03-10

## 2023-05-30 RX ORDER — APIXABAN 5 MG/1
1 TABLET, FILM COATED ORAL 2 TIMES DAILY
COMMUNITY
Start: 2023-05-23

## 2023-05-30 RX ORDER — ATORVASTATIN CALCIUM 40 MG/1
40 TABLET, FILM COATED ORAL NIGHTLY
COMMUNITY
Start: 2023-05-09

## 2023-05-30 RX ORDER — ASPIRIN 81 MG/1
81 TABLET ORAL NIGHTLY
COMMUNITY
Start: 2023-03-10

## 2023-05-30 RX ORDER — FINASTERIDE 5 MG/1
5 TABLET, FILM COATED ORAL NIGHTLY
COMMUNITY
Start: 2023-05-23

## 2023-06-07 ENCOUNTER — ANESTHESIA EVENT (OUTPATIENT)
Dept: SURGERY | Facility: HOSPITAL | Age: 73
End: 2023-06-07
Payer: MEDICARE

## 2023-06-07 NOTE — ANESTHESIA PREPROCEDURE EVALUATION
06/07/2023  Damon Adkins is a 73 y.o., male.      Pre-op Assessment    I have reviewed the Patient Summary Reports.     I have reviewed the Nursing Notes. I have reviewed the NPO Status.   I have reviewed the Medications.     Review of Systems  Anesthesia Hx:  Denies Family Hx of Anesthesia complications.   Denies Personal Hx of Anesthesia complications.   Social:  Former Smoker, Alcohol Use    Hematology/Oncology:  Hematology Normal   Oncology Normal     EENT/Dental:EENT/Dental Normal   Cardiovascular:   CAD asymptomatic  ECG has been reviewed.    Pulmonary:   Sleep Apnea    Renal/:  Renal/ Normal     Hepatic/GI:  Hepatic/GI Normal    Musculoskeletal:  Musculoskeletal Normal    Neurological:  Neurology Normal    Endocrine:  Endocrine Normal    Dermatological:  Skin Normal    Psych:   Psychiatric History          Physical Exam  General: Cooperative, Alert and Oriented    Airway:  Mallampati: II   Mouth Opening: Normal  TM Distance: Normal  Tongue: Normal  Neck ROM: Normal ROM    Dental:  Intact        Anesthesia Plan  Type of Anesthesia, risks & benefits discussed:    Anesthesia Type: Gen Natural Airway  Intra-op Monitoring Plan: Standard ASA Monitors  Post Op Pain Control Plan:   (medical reason for not using multimodal pain management)  Induction:  IV  Informed Consent: Informed consent signed with the Patient and all parties understand the risks and agree with anesthesia plan.  All questions answered. Patient consented to blood products? Yes  ASA Score: 3    Ready For Surgery From Anesthesia Perspective.     .

## 2023-06-07 NOTE — PROGRESS NOTES
"Dr. Gutierrez notified of abnormal PT/PTT results. No new orders given. States "Ok for procedure on 6/8/23".   "

## 2023-06-08 ENCOUNTER — HOSPITAL ENCOUNTER (OUTPATIENT)
Facility: HOSPITAL | Age: 73
Discharge: HOME | End: 2023-06-08
Attending: SURGERY | Admitting: SURGERY
Payer: MEDICARE

## 2023-06-08 ENCOUNTER — ANESTHESIA (OUTPATIENT)
Dept: SURGERY | Facility: HOSPITAL | Age: 73
End: 2023-06-08
Payer: MEDICARE

## 2023-06-08 VITALS
WEIGHT: 220 LBS | DIASTOLIC BLOOD PRESSURE: 69 MMHG | TEMPERATURE: 98 F | RESPIRATION RATE: 18 BRPM | HEART RATE: 61 BPM | SYSTOLIC BLOOD PRESSURE: 106 MMHG | HEIGHT: 67 IN | BODY MASS INDEX: 34.53 KG/M2 | OXYGEN SATURATION: 97 %

## 2023-06-08 DIAGNOSIS — Z86.010 HX OF COLONIC POLYPS: Primary | ICD-10-CM

## 2023-06-08 PROBLEM — Z86.0100 HX OF COLONIC POLYPS: Status: ACTIVE | Noted: 2023-06-08

## 2023-06-08 PROCEDURE — 37000008 HC ANESTHESIA 1ST 15 MINUTES: Performed by: SURGERY

## 2023-06-08 PROCEDURE — D9220A PRA ANESTHESIA: Mod: PT,,, | Performed by: NURSE ANESTHETIST, CERTIFIED REGISTERED

## 2023-06-08 PROCEDURE — 25000003 PHARM REV CODE 250: Performed by: NURSE ANESTHETIST, CERTIFIED REGISTERED

## 2023-06-08 PROCEDURE — 88305 TISSUE EXAM BY PATHOLOGIST: CPT | Performed by: SURGERY

## 2023-06-08 PROCEDURE — D9220A PRA ANESTHESIA: ICD-10-PCS | Mod: PT,,, | Performed by: NURSE ANESTHETIST, CERTIFIED REGISTERED

## 2023-06-08 PROCEDURE — 45385 COLONOSCOPY W/LESION REMOVAL: CPT | Mod: PT | Performed by: SURGERY

## 2023-06-08 PROCEDURE — C1773 RET DEV, INSERTABLE: HCPCS | Performed by: SURGERY

## 2023-06-08 PROCEDURE — 63600175 PHARM REV CODE 636 W HCPCS: Performed by: NURSE ANESTHETIST, CERTIFIED REGISTERED

## 2023-06-08 PROCEDURE — 63600175 PHARM REV CODE 636 W HCPCS: Performed by: ANESTHESIOLOGY

## 2023-06-08 PROCEDURE — 37000009 HC ANESTHESIA EA ADD 15 MINS: Performed by: SURGERY

## 2023-06-08 RX ORDER — SODIUM CHLORIDE, SODIUM LACTATE, POTASSIUM CHLORIDE, CALCIUM CHLORIDE 600; 310; 30; 20 MG/100ML; MG/100ML; MG/100ML; MG/100ML
INJECTION, SOLUTION INTRAVENOUS CONTINUOUS
Status: DISCONTINUED | OUTPATIENT
Start: 2023-06-08 | End: 2023-06-08 | Stop reason: HOSPADM

## 2023-06-08 RX ORDER — LIDOCAINE HYDROCHLORIDE 20 MG/ML
INJECTION, SOLUTION EPIDURAL; INFILTRATION; INTRACAUDAL; PERINEURAL
Status: DISCONTINUED | OUTPATIENT
Start: 2023-06-08 | End: 2023-06-08

## 2023-06-08 RX ORDER — PROPOFOL 10 MG/ML
VIAL (ML) INTRAVENOUS
Status: DISCONTINUED | OUTPATIENT
Start: 2023-06-08 | End: 2023-06-08

## 2023-06-08 RX ORDER — FENTANYL CITRATE 50 UG/ML
INJECTION, SOLUTION INTRAMUSCULAR; INTRAVENOUS
Status: DISCONTINUED | OUTPATIENT
Start: 2023-06-08 | End: 2023-06-08

## 2023-06-08 RX ORDER — SODIUM CHLORIDE 9 MG/ML
INJECTION, SOLUTION INTRAVENOUS CONTINUOUS
Status: DISCONTINUED | OUTPATIENT
Start: 2023-06-08 | End: 2023-06-08 | Stop reason: HOSPADM

## 2023-06-08 RX ADMIN — PROPOFOL 20 MG: 10 INJECTION, EMULSION INTRAVENOUS at 11:06

## 2023-06-08 RX ADMIN — SODIUM CHLORIDE, POTASSIUM CHLORIDE, SODIUM LACTATE AND CALCIUM CHLORIDE: 600; 310; 30; 20 INJECTION, SOLUTION INTRAVENOUS at 09:06

## 2023-06-08 RX ADMIN — LIDOCAINE HYDROCHLORIDE 5 ML: 20 INJECTION, SOLUTION EPIDURAL; INFILTRATION; INTRACAUDAL; PERINEURAL at 11:06

## 2023-06-08 RX ADMIN — PROPOFOL 50 MG: 10 INJECTION, EMULSION INTRAVENOUS at 11:06

## 2023-06-08 RX ADMIN — FENTANYL CITRATE 100 MCG: 50 INJECTION, SOLUTION INTRAMUSCULAR; INTRAVENOUS at 11:06

## 2023-06-08 NOTE — ANESTHESIA POSTPROCEDURE EVALUATION
Anesthesia Post Evaluation    Patient: Damon Adkins    Procedure(s) Performed: Procedure(s) (LRB):  COLONOSCOPY (N/A)    Final Anesthesia Type: general      Patient location during evaluation: OPS  Patient participation: Yes- Able to Participate  Level of consciousness: awake and alert  Post-procedure vital signs: reviewed and stable  Pain management: adequate  Airway patency: patent  JULIAN mitigation strategies: Multimodal analgesia  PONV status at discharge: No PONV  Anesthetic complications: no      Cardiovascular status: blood pressure returned to baseline  Respiratory status: unassisted  Hydration status: euvolemic  Follow-up not needed.          Vitals Value Taken Time   /69 06/08/23 0942   Temp 36.6 °C (97.9 °F) 06/08/23 0942   Pulse 65 06/08/23 0942   Resp 18 06/08/23 0942   SpO2 97 % 06/08/23 0942         No case tracking events are documented in the log.      Pain/Kasi Score: No data recorded

## 2023-06-08 NOTE — DISCHARGE SUMMARY
[facility] - Periop Services  Discharge Note  Short Stay    Procedure(s) (LRB):  COLONOSCOPY (N/A)  COLONOSCOPY, WITH POLYPECTOMY USING SNARE (N/A)      OUTCOME: Patient tolerated treatment/procedure well without complication and is now ready for discharge.    DISPOSITION: Home or Self Care    FINAL DIAGNOSIS:  Hx of colonic polyps    FOLLOWUP: In clinic 1 week    DISCHARGE INSTRUCTIONS:    Discharge Procedure Orders   Diet general        TIME SPENT ON DISCHARGE:  5 minutes

## 2023-06-08 NOTE — OP NOTE
[facility] - Periop Services  Operative Note      Date of Procedure: 6/8/2023     Procedure: Procedure(s) (LRB):  COLONOSCOPY (N/A)  COLONOSCOPY, WITH POLYPECTOMY USING SNARE (N/A)     Surgeon(s) and Role:     * Ajit Gutierrez MD - Primary    Assisting Surgeon: None    Pre-Operative Diagnosis: Hx of colonic polyps [Z86.010]    Post-Operative Diagnosis: Post-Op Diagnosis Codes:     * Hx of colonic polyps [Z86.010]  1. Normal terminal ileum up to 20 cm   2. Normal cecum  3. Polyp in the mid ascending colon removed with a hot loop snare and biopsied at the no ink spot necessary less than 1 cm in size appeared to be consistent with a benign adenoma   4. Normal transverse descending colon  5. Diverticular disease of the rectosigmoid colon  6. Grade 2 internal hemorrhoids      Anesthesia: General    Operative Findings (including complications, if any):  Patient is a 73-year-old  male with a history of atrial fibrillation on Coumadin has anxiety, coronary disease, sleep apnea on CPAP, osteoarthritis, benign prostatic hypertrophy, hypercholesterolemia, hypertension.  Patient had a coronary bypass x3 vessels in 2015.  He had a colonoscopy in 2018 that showed a tubular adenoma at 70 cm that was removed.  It was marked with an ink spot.  Patient also had grade 2 hemorrhoids.  Patient has returned with need for age-appropriate screening colonoscopy due to history of polyps in 2018.    Patient was brought to the GI suite underwent sedation and examination of the colon all the way to the cecum.  Patient had intubation of the ileocecal valve and examination terminal ileum.    Terminal ileum was normal up to 20 cm  Cecum ascending colon were normal with the exception of a polyp in the mid ascending colon that was removed with a hot loop snare no marking was necessary the polyp was about 1 cm in size.  Looks like a benign adenoma  The transverse and descending colon were normal   There was a area that was  marked with ink previous at 70 cm  that had no recurrent polyp disease  And had some diverticulosis scattered   Grade 2 internal hemorrhoids were seen no other pathology was visualized      1. Follow up in 1 week to discuss polypectomy results  2. Follow up in 2 years recheck stools for blood repeat colonoscopy due to history of polyp formation        Description of Technical Procedures:  Noted above    Significant Surgical Tasks Conducted by the Assistant(s), if Applicable:  None    Estimated Blood Loss (EBL): 1 mL           Implants: * No implants in log *    Specimens:   Specimen (24h ago, onward)       Start     Ordered    06/08/23 1202  Specimen to Pathology  RELEASE UPON ORDERING        References:    Click here for ordering Quick Tip   Question:  Release to patient  Answer:  Immediate    06/08/23 1202                            Condition: Good    Disposition: PACU - hemodynamically stable.    Attestation: I was present and scrubbed for the entire procedure.    Discharge Note    OUTCOME: Patient tolerated treatment/procedure well without complication and is now ready for discharge.        DISPOSITION: Home or Self Care    FINAL DIAGNOSIS:  Hx of colonic polyps normal terminal ileum, polyp in the mid ascending colon removed with a hot loop snare and biopsied at the base, normal colonoscopy with diverticulosis of the sigmoid colon and no recurrent polyp at 70 cm, grade 2 internal hemorrhoids    FOLLOWUP: In clinic 1 week    DISCHARGE INSTRUCTIONS:    Discharge Procedure Orders   Diet general

## 2023-06-12 LAB — PSYCHE PATHOLOGY RESULT: NORMAL

## 2023-09-01 ENCOUNTER — LAB VISIT (OUTPATIENT)
Dept: LAB | Facility: HOSPITAL | Age: 73
End: 2023-09-01
Attending: FAMILY MEDICINE
Payer: MEDICARE

## 2023-09-01 DIAGNOSIS — L25.9 INDUSTRIAL DERMATITIS: ICD-10-CM

## 2023-09-01 DIAGNOSIS — M19.90 SENILE ARTHRITIS: ICD-10-CM

## 2023-09-01 DIAGNOSIS — N52.9 IMPOTENCE OF ORGANIC ORIGIN: ICD-10-CM

## 2023-09-01 DIAGNOSIS — I25.10 CORONARY ATHEROSCLEROSIS OF NATIVE CORONARY ARTERY: ICD-10-CM

## 2023-09-01 DIAGNOSIS — R42 DIZZINESS AND GIDDINESS: ICD-10-CM

## 2023-09-01 LAB
ALBUMIN SERPL-MCNC: 4.3 G/DL (ref 3.4–4.8)
ALBUMIN/GLOB SERPL: 1.3 RATIO (ref 1.1–2)
ALP SERPL-CCNC: 52 UNIT/L (ref 40–150)
ALT SERPL-CCNC: 15 UNIT/L (ref 0–55)
APPEARANCE UR: CLEAR
AST SERPL-CCNC: 12 UNIT/L (ref 5–34)
BACTERIA #/AREA URNS AUTO: NORMAL /HPF
BILIRUB SERPL-MCNC: 0.5 MG/DL
BILIRUB UR QL STRIP.AUTO: NEGATIVE
BUN SERPL-MCNC: 17.3 MG/DL (ref 8.4–25.7)
CALCIUM SERPL-MCNC: 9.3 MG/DL (ref 8.8–10)
CHLORIDE SERPL-SCNC: 104 MMOL/L (ref 98–107)
CHOLEST SERPL-MCNC: 155 MG/DL
CHOLEST/HDLC SERPL: 3 {RATIO} (ref 0–5)
CO2 SERPL-SCNC: 27 MMOL/L (ref 23–31)
COLOR UR: YELLOW
CREAT SERPL-MCNC: 0.96 MG/DL (ref 0.73–1.18)
ERYTHROCYTE [DISTWIDTH] IN BLOOD BY AUTOMATED COUNT: 13.5 % (ref 11.5–17)
GFR SERPLBLD CREATININE-BSD FMLA CKD-EPI: >60 MLS/MIN/1.73/M2
GLOBULIN SER-MCNC: 3.3 GM/DL (ref 2.4–3.5)
GLUCOSE SERPL-MCNC: 113 MG/DL (ref 82–115)
GLUCOSE UR QL STRIP.AUTO: NEGATIVE
HCT VFR BLD AUTO: 43.8 % (ref 42–52)
HDLC SERPL-MCNC: 45 MG/DL (ref 35–60)
HGB BLD-MCNC: 13.8 G/DL (ref 14–18)
KETONES UR QL STRIP.AUTO: ABNORMAL
LDLC SERPL CALC-MCNC: 94 MG/DL (ref 50–140)
LEUKOCYTE ESTERASE UR QL STRIP.AUTO: NEGATIVE
MCH RBC QN AUTO: 30.9 PG (ref 27–31)
MCHC RBC AUTO-ENTMCNC: 31.5 G/DL (ref 33–36)
MCV RBC AUTO: 98.2 FL (ref 80–94)
NITRITE UR QL STRIP.AUTO: NEGATIVE
PH UR STRIP.AUTO: 6 [PH]
PLATELET # BLD AUTO: 247 X10(3)/MCL (ref 130–400)
PMV BLD AUTO: 10.5 FL (ref 7.4–10.4)
POTASSIUM SERPL-SCNC: 4.1 MMOL/L (ref 3.5–5.1)
PROT SERPL-MCNC: 7.6 GM/DL (ref 5.8–7.6)
PROT UR QL STRIP.AUTO: ABNORMAL
PSA SERPL-MCNC: 0.23 NG/ML
RBC # BLD AUTO: 4.46 X10(6)/MCL (ref 4.7–6.1)
RBC #/AREA URNS AUTO: NORMAL /HPF
RBC UR QL AUTO: ABNORMAL
SODIUM SERPL-SCNC: 138 MMOL/L (ref 136–145)
SP GR UR STRIP.AUTO: 1.02 (ref 1–1.03)
SQUAMOUS #/AREA URNS AUTO: NORMAL /HPF
TRIGL SERPL-MCNC: 78 MG/DL (ref 34–140)
TSH SERPL-ACNC: 1.67 UIU/ML (ref 0.35–4.94)
UROBILINOGEN UR STRIP-ACNC: 0.2
VLDLC SERPL CALC-MCNC: 16 MG/DL
WBC # SPEC AUTO: 12.58 X10(3)/MCL (ref 4.5–11.5)
WBC #/AREA URNS AUTO: NORMAL /HPF

## 2023-09-01 PROCEDURE — 80061 LIPID PANEL: CPT

## 2023-09-01 PROCEDURE — 84443 ASSAY THYROID STIM HORMONE: CPT

## 2023-09-01 PROCEDURE — 36415 COLL VENOUS BLD VENIPUNCTURE: CPT

## 2023-09-01 PROCEDURE — 80053 COMPREHEN METABOLIC PANEL: CPT

## 2023-09-01 PROCEDURE — 81001 URINALYSIS AUTO W/SCOPE: CPT

## 2023-09-01 PROCEDURE — 84153 ASSAY OF PSA TOTAL: CPT

## 2023-09-01 PROCEDURE — 85027 COMPLETE CBC AUTOMATED: CPT

## 2023-10-02 ENCOUNTER — LAB VISIT (OUTPATIENT)
Dept: LAB | Facility: HOSPITAL | Age: 73
End: 2023-10-02
Attending: FAMILY MEDICINE
Payer: MEDICARE

## 2023-10-02 DIAGNOSIS — Z12.5 SPECIAL SCREENING FOR MALIGNANT NEOPLASM OF PROSTATE: ICD-10-CM

## 2023-10-02 DIAGNOSIS — R42 DIZZINESS AND GIDDINESS: ICD-10-CM

## 2023-10-02 DIAGNOSIS — N52.9 IMPOTENCE OF ORGANIC ORIGIN: ICD-10-CM

## 2023-10-02 DIAGNOSIS — I10 ESSENTIAL HYPERTENSION, MALIGNANT: ICD-10-CM

## 2023-10-02 DIAGNOSIS — I48.91 ATRIAL FIBRILLATION: ICD-10-CM

## 2023-10-02 DIAGNOSIS — L25.9 INDUSTRIAL DERMATITIS: ICD-10-CM

## 2023-10-02 DIAGNOSIS — Z96.653 PRESENCE OF BOTH ARTIFICIAL KNEE JOINTS: ICD-10-CM

## 2023-10-02 DIAGNOSIS — G47.00 PERSISTENT DISORDER OF INITIATING OR MAINTAINING SLEEP: ICD-10-CM

## 2023-10-02 DIAGNOSIS — G47.30 INSOMNIA WITH SLEEP APNEA: ICD-10-CM

## 2023-10-02 DIAGNOSIS — I25.10 CORONARY ATHEROSCLEROSIS OF NATIVE CORONARY ARTERY: ICD-10-CM

## 2023-10-02 DIAGNOSIS — G47.00 INSOMNIA WITH SLEEP APNEA: ICD-10-CM

## 2023-10-02 DIAGNOSIS — F41.9 ANXIETY HYPERVENTILATION: ICD-10-CM

## 2023-10-02 DIAGNOSIS — Z89.029: Primary | ICD-10-CM

## 2023-10-02 DIAGNOSIS — F45.8 ANXIETY HYPERVENTILATION: ICD-10-CM

## 2023-10-02 DIAGNOSIS — E78.5 HYPERLIPIDEMIA, UNSPECIFIED HYPERLIPIDEMIA TYPE: ICD-10-CM

## 2023-10-02 DIAGNOSIS — H61.21 IMPACTED CERUMEN OF RIGHT EAR: ICD-10-CM

## 2023-10-02 DIAGNOSIS — M19.90 SENILE ARTHRITIS: ICD-10-CM

## 2023-10-02 LAB
ALBUMIN SERPL-MCNC: 4.4 G/DL (ref 3.4–4.8)
ALBUMIN/GLOB SERPL: 1.4 RATIO (ref 1.1–2)
ALP SERPL-CCNC: 56 UNIT/L (ref 40–150)
ALT SERPL-CCNC: 19 UNIT/L (ref 0–55)
APPEARANCE UR: CLEAR
AST SERPL-CCNC: 18 UNIT/L (ref 5–34)
BACTERIA #/AREA URNS AUTO: NORMAL /HPF
BILIRUB SERPL-MCNC: 0.8 MG/DL
BILIRUB UR QL STRIP.AUTO: NEGATIVE
BUN SERPL-MCNC: 10.3 MG/DL (ref 8.4–25.7)
CALCIUM SERPL-MCNC: 9.8 MG/DL (ref 8.8–10)
CHLORIDE SERPL-SCNC: 104 MMOL/L (ref 98–107)
CHOLEST SERPL-MCNC: 130 MG/DL
CHOLEST/HDLC SERPL: 3 {RATIO} (ref 0–5)
CO2 SERPL-SCNC: 29 MMOL/L (ref 23–31)
COLOR UR AUTO: YELLOW
CREAT SERPL-MCNC: 0.91 MG/DL (ref 0.73–1.18)
ERYTHROCYTE [DISTWIDTH] IN BLOOD BY AUTOMATED COUNT: 13.5 % (ref 11.5–17)
GFR SERPLBLD CREATININE-BSD FMLA CKD-EPI: >60 MLS/MIN/1.73/M2
GLOBULIN SER-MCNC: 3.2 GM/DL (ref 2.4–3.5)
GLUCOSE SERPL-MCNC: 97 MG/DL (ref 82–115)
GLUCOSE UR QL STRIP.AUTO: NEGATIVE
HCT VFR BLD AUTO: 45.3 % (ref 42–52)
HDLC SERPL-MCNC: 38 MG/DL (ref 35–60)
HGB BLD-MCNC: 14.3 G/DL (ref 14–18)
KETONES UR QL STRIP.AUTO: ABNORMAL
LDLC SERPL CALC-MCNC: 50 MG/DL (ref 50–140)
LEUKOCYTE ESTERASE UR QL STRIP.AUTO: NEGATIVE
MCH RBC QN AUTO: 30.6 PG (ref 27–31)
MCHC RBC AUTO-ENTMCNC: 31.6 G/DL (ref 33–36)
MCV RBC AUTO: 97 FL (ref 80–94)
NITRITE UR QL STRIP.AUTO: NEGATIVE
PH UR STRIP.AUTO: 8.5 [PH]
PLATELET # BLD AUTO: 280 X10(3)/MCL (ref 130–400)
PMV BLD AUTO: 10.6 FL (ref 7.4–10.4)
POTASSIUM SERPL-SCNC: 4.1 MMOL/L (ref 3.5–5.1)
PROT SERPL-MCNC: 7.6 GM/DL (ref 5.8–7.6)
PROT UR QL STRIP.AUTO: NEGATIVE
RBC # BLD AUTO: 4.67 X10(6)/MCL (ref 4.7–6.1)
RBC #/AREA URNS AUTO: NORMAL /HPF
RBC UR QL AUTO: NEGATIVE
SODIUM SERPL-SCNC: 141 MMOL/L (ref 136–145)
SP GR UR STRIP.AUTO: 1.02 (ref 1–1.03)
SQUAMOUS #/AREA URNS AUTO: NORMAL /HPF
TRIGL SERPL-MCNC: 210 MG/DL (ref 34–140)
TSH SERPL-ACNC: 2.04 UIU/ML (ref 0.35–4.94)
UROBILINOGEN UR STRIP-ACNC: 1
VLDLC SERPL CALC-MCNC: 42 MG/DL
WBC # SPEC AUTO: 8.57 X10(3)/MCL (ref 4.5–11.5)
WBC #/AREA URNS AUTO: NORMAL /HPF

## 2023-10-02 PROCEDURE — 84443 ASSAY THYROID STIM HORMONE: CPT

## 2023-10-02 PROCEDURE — 82785 ASSAY OF IGE: CPT

## 2023-10-02 PROCEDURE — 36415 COLL VENOUS BLD VENIPUNCTURE: CPT

## 2023-10-02 PROCEDURE — 85027 COMPLETE CBC AUTOMATED: CPT

## 2023-10-02 PROCEDURE — 80053 COMPREHEN METABOLIC PANEL: CPT

## 2023-10-02 PROCEDURE — 80061 LIPID PANEL: CPT

## 2023-10-02 PROCEDURE — 81001 URINALYSIS AUTO W/SCOPE: CPT

## 2023-10-02 PROCEDURE — 82785 ASSAY OF IGE: CPT | Mod: 91

## 2023-10-03 LAB
ALLERGEN ALMOND IGE (OLG): <0.1 KUA/L
ALLERGEN ALTERNARIA ALTERNATA IGE (OLG): <0.1 KUA/L
ALLERGEN ASPERGILLUS FUMIGATUS IGE (OLG): <0.1 KUA/L
ALLERGEN BERMUDA GRASS IGE (OLG): <0.1 KUA/L
ALLERGEN BOXELDER MAPLE TREE IGE (OLG): <0.1 KUA/L
ALLERGEN CASHEW NUT IGE (OLG): <0.1 KUA/L
ALLERGEN CAT DANDER IGE (OLG): <0.1 KUA/L
ALLERGEN CLADOSPORIUM HERBARUM IGE (OLG): <0.1 KUA/L
ALLERGEN COCKROACH GERMAN IGE (OLG): <0.1 KUA/L
ALLERGEN CODFISH IGE (OLG): <0.1 KUA/L
ALLERGEN COMMON RAGWEED IGE (OLG): <0.1 KUA/L
ALLERGEN CRAB IGE (OLG): <0.1 KUA/L
ALLERGEN DOG DANDER IGE (OLG): <0.1 KUA/L
ALLERGEN DUST MITE (D. PTERONYSSINUS) IGE (OLG): <0.1 KUA/L
ALLERGEN DUST MITE (D.FARINAE) IGE (OLG): <0.1 KUA/L
ALLERGEN EGG WHITE IGE (OLG): <0.1 KUA/L
ALLERGEN EGG YOLK IGE (OLG): <0.1 KUA/L
ALLERGEN ELM TREE IGE (OLG): <0.1 KUA/L
ALLERGEN HAZELNUT IGE (OLG): <0.1 KUA/L
ALLERGEN HORSE DANDER IGE (OLG): <0.1 KUA/L
ALLERGEN MILK IGE (OLG): <0.1 KUA/L
ALLERGEN MOUNTAIN JUNIPER TREE IGE (OLG): <0.1 KUA/L
ALLERGEN MOUSE URINE PROTEINS IGE (OLG): <0.1 KUA/L
ALLERGEN MULBERRY TREE IGE (OLG): <0.1 KUA/L
ALLERGEN OAK TREE IGE (OLG): <0.1 KUA/L
ALLERGEN PEANUT IGE (OLG): <0.1 KUA/L
ALLERGEN PECAN HICKORY TREE IGE (OLG): <0.1 KUA/L
ALLERGEN PENICILLIUM CHRYSOGENUM IGE (OLG): <0.1 KUA/L
ALLERGEN PIGWEED IGE (OLG): <0.1 KUA/L
ALLERGEN ROUGH MARSH ELDER IGE (OLG): <0.1 KUA/L
ALLERGEN SALMON IGE (OLG): <0.1 KUA/L
ALLERGEN SCALLOP IGE (OLG): <0.1 KUA/L
ALLERGEN SESAME SEED IGE (OLG): <0.1 KUA/L
ALLERGEN SHRIMP IGE (OLG): <0.1 KUA/L
ALLERGEN SILVER BIRCH TREE IGE (OLG): <0.1 KUA/L
ALLERGEN SOY BEAN IGE (OLG): <0.1 KUA/L
ALLERGEN TIMOTHY GRASS IGE (OLG): <0.1 KUA/L
ALLERGEN TUNA IGE (OLG): <0.1 KUA/L
ALLERGEN WALNUT IGE (OLG): <0.1 KUA/L
ALLERGEN WALNUT TREE IGE (OLG): <0.1 KUA/L
ALLERGEN WHEAT IGE (OLG): <0.1 KUA/L
PHADIOTOP IGE QN: 3.88 KU/L
PHADIOTOP IGE QN: 3.88 KU/L

## 2023-11-08 ENCOUNTER — HOSPITAL ENCOUNTER (EMERGENCY)
Facility: HOSPITAL | Age: 73
Discharge: HOME | End: 2023-11-08
Attending: STUDENT IN AN ORGANIZED HEALTH CARE EDUCATION/TRAINING PROGRAM
Payer: MEDICARE

## 2023-11-08 VITALS
BODY MASS INDEX: 34.53 KG/M2 | HEART RATE: 65 BPM | WEIGHT: 220 LBS | OXYGEN SATURATION: 98 % | RESPIRATION RATE: 18 BRPM | DIASTOLIC BLOOD PRESSURE: 62 MMHG | HEIGHT: 67 IN | TEMPERATURE: 98 F | SYSTOLIC BLOOD PRESSURE: 117 MMHG

## 2023-11-08 DIAGNOSIS — R52 PAIN: ICD-10-CM

## 2023-11-08 DIAGNOSIS — S93.402A SPRAIN OF LEFT ANKLE, UNSPECIFIED LIGAMENT, INITIAL ENCOUNTER: Primary | ICD-10-CM
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
## 2023-11-08 PROCEDURE — 63600175 PHARM REV CODE 636 W HCPCS: Performed by: STUDENT IN AN ORGANIZED HEALTH CARE EDUCATION/TRAINING PROGRAM

## 2023-11-08 PROCEDURE — 96372 THER/PROPH/DIAG INJ SC/IM: CPT | Performed by: STUDENT IN AN ORGANIZED HEALTH CARE EDUCATION/TRAINING PROGRAM

## 2023-11-08 PROCEDURE — 99284 EMERGENCY DEPT VISIT MOD MDM: CPT

## 2023-11-08 RX ORDER — METHOCARBAMOL 500 MG/1
1000 TABLET, FILM COATED ORAL 3 TIMES DAILY
Qty: 30 TABLET | Refills: 0 | Status: SHIPPED | OUTPATIENT
Start: 2023-11-08 | End: 2023-11-13

## 2023-11-08 RX ORDER — KETOROLAC TROMETHAMINE 30 MG/ML
30 INJECTION, SOLUTION INTRAMUSCULAR; INTRAVENOUS
Status: COMPLETED | OUTPATIENT
Start: 2023-11-08 | End: 2023-11-08

## 2023-11-08 RX ORDER — NAPROXEN 500 MG/1
500 TABLET ORAL 2 TIMES DAILY WITH MEALS
Qty: 60 TABLET | Refills: 0 | Status: SHIPPED | OUTPATIENT
Start: 2023-11-08

## 2023-11-08 RX ADMIN — KETOROLAC TROMETHAMINE 30 MG: 30 INJECTION, SOLUTION INTRAMUSCULAR; INTRAVENOUS at 09:11

## 2023-11-08 NOTE — ED PROVIDER NOTES
Encounter Date: 11/8/2023       History     Chief Complaint   Patient presents with    Leg Pain     Fell about 4 foot off ladder two days ago. Complaints of left ankle pain and left calf pain.  Ambulatory to triage.        HPI  Patient is a 73-year-old male past medical history of atrial fibrillation, anxiety, CAD, depression, hypercholesteremia, who presents to the ER complaining of left foot/ankle/calf pain ongoing for the past 2 days.  Patient states he fell off a ladder, landing on his ankle/foot wrong.  He states since then he has intermittent pain to the area.  He denies any other injury.  Review of patient's allergies indicates:  No Known Allergies  Past Medical History:   Diagnosis Date    Anxiety     Atrial fibrillation     BPH (benign prostatic hyperplasia)     Constipation     Coronary artery disease     Depression     High cholesterol     Seasonal allergies     Sleep apnea     CPAP     Past Surgical History:   Procedure Laterality Date    CHOLECYSTECTOMY      COLONOSCOPY      COLONOSCOPY N/A 6/8/2023    Procedure: COLONOSCOPY;  Surgeon: Ajit Gutierrez MD;  Location: [facility];  Service: Endoscopy;  Laterality: N/A;  DIVERTICULOSIS    COLONOSCOPY, WITH POLYPECTOMY USING SNARE N/A 6/8/2023    Procedure: COLONOSCOPY, WITH POLYPECTOMY USING SNARE;  Surgeon: Ajit Gutiererz MD;  Location: [facility] ENDO;  Service: Endoscopy;  Laterality: N/A;  A) ASCENDING COLON POLYP W/ BX OF BASE    CORONARY ARTERY BYPASS GRAFT      TOTAL KNEE ARTHROPLASTY Bilateral     TOTAL SHOULDER ARTHROPLASTY Bilateral      Family History   Problem Relation Age of Onset    Heart disease Mother     Prostate cancer Father      Social History     Tobacco Use    Smoking status: Former     Types: Cigarettes    Smokeless tobacco: Never   Substance Use Topics    Alcohol use: Yes     Comment: Occasionally     Review of Systems   Constitutional:  Negative for fever.   HENT:  Negative for sore throat.    Eyes:  Negative for visual disturbance.    Respiratory:  Negative for shortness of breath.    Cardiovascular:  Negative for chest pain.   Gastrointestinal:  Negative for nausea.   Endocrine: Negative for polyuria.   Genitourinary:  Negative for dysuria.   Musculoskeletal:  Positive for arthralgias. Negative for back pain.        Left ankle/foot pain.  Left calf pain.   Skin:  Negative for rash.   Neurological:  Negative for weakness.   Hematological:  Does not bruise/bleed easily.   All other systems reviewed and are negative.      Physical Exam     Initial Vitals [11/08/23 0857]   BP Pulse Resp Temp SpO2   117/62 65 18 98.3 °F (36.8 °C) 98 %      MAP       --         Physical Exam    Nursing note and vitals reviewed.  Constitutional: Vital signs are normal. He appears well-developed and well-nourished. He is not diaphoretic. He is active.  Non-toxic appearance. He does not appear ill. No distress.   HENT:   Head: Normocephalic and atraumatic.   Eyes: Conjunctivae are normal. Pupils are equal, round, and reactive to light. Right conjunctiva is not injected. Left conjunctiva is not injected.   Neck: Trachea normal. Neck supple.   Normal range of motion.   Full passive range of motion without pain.     Cardiovascular:  Normal rate, regular rhythm, S1 normal, S2 normal, intact distal pulses and normal pulses.           Pulmonary/Chest: Breath sounds normal. No respiratory distress. He has no wheezes.   Abdominal: Abdomen is soft. Bowel sounds are normal. There is no abdominal tenderness.   Musculoskeletal:         General: Tenderness present. No edema. Normal range of motion.      Cervical back: Full passive range of motion without pain, normal range of motion and neck supple. No rigidity.      Right lower leg: No swelling. No edema.      Left lower leg: No swelling. No edema.      Comments: Mild reproducible tenderness noted to the midfoot, dorsal aspect.  No overlying skin changes appreciated.  2+ DP pulses bilaterally.  Full range of motion of knee, ankle  without significant pain appreciated.     Neurological: He is alert and oriented to person, place, and time.   Skin: Skin is warm and dry. Capillary refill takes less than 2 seconds.         ED Course   Procedures  Labs Reviewed - No data to display       Imaging Results              X-Ray Tibia Fibula 2 View Left (Final result)  Result time 11/08/23 10:12:16      Final result by Bayron Todd MD (11/08/23 10:12:16)                   Impression:      No displaced fracture      Electronically signed by: Bayron Todd MD  Date:    11/08/2023  Time:    10:12               Narrative:    EXAMINATION:  Eight images left tibia, fibula, ankle and foot    CLINICAL HISTORY:  Fall 2 days ago    COMPARISON:  None    FINDINGS:  Left knee arthroplasty hardware shows no evidence of loosening.  Vascular calcifications are present.  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  Moderate degenerative changes are at the 1st MTP joint.  There is no displaced fracture or dislocation identified.                                       X-Ray Ankle Complete Left (Final result)  Result time 11/08/23 10:12:16      Final result by Bayron Todd MD (11/08/23 10:12:16)                   Impression:      No displaced fracture      Electronically signed by: Bayron Todd MD  Date:    11/08/2023  Time:    10:12               Narrative:    EXAMINATION:  Eight images left tibia, fibula, ankle and foot    CLINICAL HISTORY:  Fall 2 days ago    COMPARISON:  None    FINDINGS:  Left knee arthroplasty hardware shows no evidence of loosening.  Vascular calcifications are present.  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  Moderate degenerative changes are at the 1st MTP joint.  There is no displaced fracture or dislocation identified.                                       X-Ray Foot Complete Left (Final result)  Result time 11/08/23 10:12:16      Final result by Bayron Todd MD  (11/08/23 10:12:16)                   Impression:      No displaced fracture      Electronically signed by: Bayron Todd MD  Date:    11/08/2023  Time:    10:12               Narrative:    EXAMINATION:  Eight images left tibia, fibula, ankle and foot    CLINICAL HISTORY:  Fall 2 days ago    COMPARISON:  None    FINDINGS:  Left knee arthroplasty hardware shows no evidence of loosening.  Vascular calcifications are present.  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  Moderate degenerative changes are at the 1st MTP joint.  There is no displaced fracture or dislocation identified.                                       Medications   ketorolac injection 30 mg (30 mg Intramuscular Given 11/8/23 0935)     Medical Decision Making  Amount and/or Complexity of Data Reviewed  Radiology: ordered.    Risk  Prescription drug management.                          Medical Decision Making:   Initial Assessment:   Patient is a 73-year-old male past medical history of atrial fibrillation, anxiety, CAD, depression, hypercholesteremia, who presents to the ER complaining of left foot/ankle/calf pain ongoing for the past 2 days.  Differential Diagnosis:   Ankle sprain, ankle fracture, ankle dislocation,  Tib/fib fracture  Clinical Tests:   Radiological Study: Ordered and Reviewed  ED Management:  Plain films reviewed by me negative for any acute fractures, dislocation.  Images also reviewed by radiologist read as the same.  Patient is a time stable for discharge.  He was given Toradol here in the ER.  Will discharge with muscle relaxants, NSAIDs.  Strict return precautions given if symptoms worsen, change return to ER for further care.  Patient amenable to plan as noted.    Aravind Owusu M.D.  Emergency Medicine          Clinical Impression:   Final diagnoses:  [R52] Pain  [W19.XXXA] Fall  [S93.402A] Sprain of left ankle, unspecified ligament, initial encounter (Primary)        ED Disposition Condition     Discharge Stable          ED Prescriptions       Medication Sig Dispense Start Date End Date Auth. Provider    naproxen (NAPROSYN) 500 MG tablet Take 1 tablet (500 mg total) by mouth 2 (two) times daily with meals. 60 tablet 11/8/2023 -- Aravind Owusu MD    methocarbamoL (ROBAXIN) 500 MG Tab Take 2 tablets (1,000 mg total) by mouth 3 (three) times daily. for 5 days 30 tablet 11/8/2023 11/13/2023 Aravind Owusu MD          Follow-up Information       Follow up With Specialties Details Why Contact Info    Rashad Manuel MD Family Medicine Schedule an appointment as soon as possible for a visit in 2 days For follow up [address] [facility].  [facility] [address]  [phone]      [facility] Emergency Medicine  If symptoms worsen [address] [id number] [phone]             Aravind Owusu MD  11/08/23 1030

## 2024-03-08 ENCOUNTER — LAB VISIT (OUTPATIENT)
Dept: LAB | Facility: HOSPITAL | Age: 74
End: 2024-03-08
Attending: INTERNAL MEDICINE
Payer: MEDICARE

## 2024-03-08 DIAGNOSIS — I10 ESSENTIAL HYPERTENSION, MALIGNANT: ICD-10-CM

## 2024-03-08 DIAGNOSIS — I25.10 CORONARY ATHEROSCLEROSIS OF NATIVE CORONARY ARTERY: Primary | ICD-10-CM

## 2024-03-08 LAB
ALBUMIN SERPL-MCNC: 4.2 G/DL (ref 3.4–4.8)
ALBUMIN/GLOB SERPL: 1.6 RATIO (ref 1.1–2)
ALP SERPL-CCNC: 51 UNIT/L (ref 40–150)
ALT SERPL-CCNC: 12 UNIT/L (ref 0–55)
AST SERPL-CCNC: 14 UNIT/L (ref 5–34)
BASOPHILS # BLD AUTO: 0.04 X10(3)/MCL
BASOPHILS NFR BLD AUTO: 0.6 %
BILIRUB SERPL-MCNC: 0.8 MG/DL
BUN SERPL-MCNC: 14.6 MG/DL (ref 8.4–25.7)
CALCIUM SERPL-MCNC: 9.4 MG/DL (ref 8.8–10)
CHLORIDE SERPL-SCNC: 107 MMOL/L (ref 98–107)
CO2 SERPL-SCNC: 24 MMOL/L (ref 23–31)
CREAT SERPL-MCNC: 0.93 MG/DL (ref 0.73–1.18)
EOSINOPHIL # BLD AUTO: 0.1 X10(3)/MCL (ref 0–0.9)
EOSINOPHIL NFR BLD AUTO: 1.4 %
ERYTHROCYTE [DISTWIDTH] IN BLOOD BY AUTOMATED COUNT: 13.8 % (ref 11.5–17)
GFR SERPLBLD CREATININE-BSD FMLA CKD-EPI: >60 MLS/MIN/1.73/M2
GLOBULIN SER-MCNC: 2.7 GM/DL (ref 2.4–3.5)
GLUCOSE SERPL-MCNC: 107 MG/DL (ref 82–115)
HCT VFR BLD AUTO: 42.9 % (ref 42–52)
HGB BLD-MCNC: 14.2 G/DL (ref 14–18)
IMM GRANULOCYTES # BLD AUTO: 0.01 X10(3)/MCL (ref 0–0.04)
IMM GRANULOCYTES NFR BLD AUTO: 0.1 %
LYMPHOCYTES # BLD AUTO: 1.69 X10(3)/MCL (ref 0.6–4.6)
LYMPHOCYTES NFR BLD AUTO: 23.8 %
MCH RBC QN AUTO: 32 PG (ref 27–31)
MCHC RBC AUTO-ENTMCNC: 33.1 G/DL (ref 33–36)
MCV RBC AUTO: 96.6 FL (ref 80–94)
MONOCYTES # BLD AUTO: 0.98 X10(3)/MCL (ref 0.1–1.3)
MONOCYTES NFR BLD AUTO: 13.8 %
NEUTROPHILS # BLD AUTO: 4.27 X10(3)/MCL (ref 2.1–9.2)
NEUTROPHILS NFR BLD AUTO: 60.3 %
PLATELET # BLD AUTO: 224 X10(3)/MCL (ref 130–400)
PMV BLD AUTO: 10.6 FL (ref 7.4–10.4)
POTASSIUM SERPL-SCNC: 4.3 MMOL/L (ref 3.5–5.1)
PROT SERPL-MCNC: 6.9 GM/DL (ref 5.8–7.6)
RBC # BLD AUTO: 4.44 X10(6)/MCL (ref 4.7–6.1)
SODIUM SERPL-SCNC: 141 MMOL/L (ref 136–145)
WBC # SPEC AUTO: 7.09 X10(3)/MCL (ref 4.5–11.5)

## 2024-03-08 PROCEDURE — 36415 COLL VENOUS BLD VENIPUNCTURE: CPT

## 2024-03-08 PROCEDURE — 80053 COMPREHEN METABOLIC PANEL: CPT

## 2024-03-08 PROCEDURE — 85025 COMPLETE CBC W/AUTO DIFF WBC: CPT

## 2025-01-06 ENCOUNTER — LAB VISIT (OUTPATIENT)
Dept: LAB | Facility: HOSPITAL | Age: 75
End: 2025-01-06
Attending: NURSE PRACTITIONER
Payer: MEDICARE

## 2025-01-06 DIAGNOSIS — H61.21 IMPACTED CERUMEN OF RIGHT EAR: ICD-10-CM

## 2025-01-06 DIAGNOSIS — G47.00 PERSISTENT DISORDER OF INITIATING OR MAINTAINING SLEEP: ICD-10-CM

## 2025-01-06 DIAGNOSIS — F41.9 ANXIETY DISORDER OF CHILDHOOD OR ADOLESCENCE: ICD-10-CM

## 2025-01-06 DIAGNOSIS — Z89.029: Primary | ICD-10-CM

## 2025-01-06 DIAGNOSIS — M19.90 SENILE ARTHRITIS: ICD-10-CM

## 2025-01-06 DIAGNOSIS — R42 DIZZINESS AND GIDDINESS: ICD-10-CM

## 2025-01-06 DIAGNOSIS — N52.9 IMPOTENCE OF ORGANIC ORIGIN: ICD-10-CM

## 2025-01-06 DIAGNOSIS — G47.00 INSOMNIA WITH SLEEP APNEA: ICD-10-CM

## 2025-01-06 DIAGNOSIS — E78.5 HYPERLIPIDEMIA, UNSPECIFIED HYPERLIPIDEMIA TYPE: ICD-10-CM

## 2025-01-06 DIAGNOSIS — I10 ESSENTIAL HYPERTENSION, MALIGNANT: ICD-10-CM

## 2025-01-06 DIAGNOSIS — I48.91 ATRIAL FIBRILLATION: ICD-10-CM

## 2025-01-06 DIAGNOSIS — G47.30 INSOMNIA WITH SLEEP APNEA: ICD-10-CM

## 2025-01-06 DIAGNOSIS — Z96.653 PRESENCE OF BOTH ARTIFICIAL KNEE JOINTS: ICD-10-CM

## 2025-01-06 DIAGNOSIS — B35.1 DERMATOPHYTOSIS OF NAIL: ICD-10-CM

## 2025-01-06 LAB
ALBUMIN SERPL-MCNC: 4.1 G/DL (ref 3.4–4.8)
ALBUMIN/GLOB SERPL: 1.4 RATIO (ref 1.1–2)
ALP SERPL-CCNC: 55 UNIT/L (ref 40–150)
ALT SERPL-CCNC: 15 UNIT/L (ref 0–55)
ANION GAP SERPL CALC-SCNC: 7 MEQ/L
AST SERPL-CCNC: 15 UNIT/L (ref 5–34)
BACTERIA #/AREA URNS AUTO: NORMAL /HPF
BILIRUB SERPL-MCNC: 0.6 MG/DL
BILIRUB UR QL STRIP.AUTO: NEGATIVE
BUN SERPL-MCNC: 15.4 MG/DL (ref 8.4–25.7)
CALCIUM SERPL-MCNC: 9.6 MG/DL (ref 8.8–10)
CHLORIDE SERPL-SCNC: 106 MMOL/L (ref 98–107)
CHOLEST SERPL-MCNC: 110 MG/DL
CHOLEST/HDLC SERPL: 4 {RATIO} (ref 0–5)
CLARITY UR: CLEAR
CO2 SERPL-SCNC: 28 MMOL/L (ref 23–31)
COLOR UR AUTO: NORMAL
CREAT SERPL-MCNC: 0.87 MG/DL (ref 0.72–1.25)
CREAT/UREA NIT SERPL: 18
ERYTHROCYTE [DISTWIDTH] IN BLOOD BY AUTOMATED COUNT: 13.4 % (ref 11.5–17)
GFR SERPLBLD CREATININE-BSD FMLA CKD-EPI: >60 ML/MIN/1.73/M2
GLOBULIN SER-MCNC: 2.9 GM/DL (ref 2.4–3.5)
GLUCOSE SERPL-MCNC: 104 MG/DL (ref 82–115)
GLUCOSE UR QL STRIP: NEGATIVE
HCT VFR BLD AUTO: 41 % (ref 42–52)
HDLC SERPL-MCNC: 31 MG/DL (ref 35–60)
HGB BLD-MCNC: 13.2 G/DL (ref 14–18)
HGB UR QL STRIP: NEGATIVE
KETONES UR QL STRIP: NEGATIVE
LDLC SERPL CALC-MCNC: 54 MG/DL (ref 50–140)
LEUKOCYTE ESTERASE UR QL STRIP: NEGATIVE
MCH RBC QN AUTO: 32.2 PG (ref 27–31)
MCHC RBC AUTO-ENTMCNC: 32.2 G/DL (ref 33–36)
MCV RBC AUTO: 100 FL (ref 80–94)
NITRITE UR QL STRIP: NEGATIVE
PH UR STRIP: 6 [PH]
PLATELET # BLD AUTO: 246 X10(3)/MCL (ref 130–400)
PMV BLD AUTO: 10.5 FL (ref 7.4–10.4)
POTASSIUM SERPL-SCNC: 4.5 MMOL/L (ref 3.5–5.1)
PROT SERPL-MCNC: 7 GM/DL (ref 5.8–7.6)
PROT UR QL STRIP: NEGATIVE
PSA SERPL-MCNC: 0.33 NG/ML
RBC # BLD AUTO: 4.1 X10(6)/MCL (ref 4.7–6.1)
RBC #/AREA URNS AUTO: NORMAL /HPF
SODIUM SERPL-SCNC: 141 MMOL/L (ref 136–145)
SP GR UR STRIP.AUTO: 1.01 (ref 1–1.03)
SQUAMOUS #/AREA URNS AUTO: NORMAL /HPF
TRIGL SERPL-MCNC: 127 MG/DL (ref 34–140)
TSH SERPL-ACNC: 2.21 UIU/ML (ref 0.35–4.94)
UROBILINOGEN UR STRIP-ACNC: 0.2
VLDLC SERPL CALC-MCNC: 25 MG/DL
WBC # BLD AUTO: 7.4 X10(3)/MCL (ref 4.5–11.5)
WBC #/AREA URNS AUTO: NORMAL /HPF

## 2025-01-06 PROCEDURE — 84443 ASSAY THYROID STIM HORMONE: CPT

## 2025-01-06 PROCEDURE — 84153 ASSAY OF PSA TOTAL: CPT

## 2025-01-06 PROCEDURE — 81003 URINALYSIS AUTO W/O SCOPE: CPT

## 2025-01-06 PROCEDURE — 80061 LIPID PANEL: CPT

## 2025-01-06 PROCEDURE — 85027 COMPLETE CBC AUTOMATED: CPT

## 2025-01-06 PROCEDURE — 36415 COLL VENOUS BLD VENIPUNCTURE: CPT

## 2025-01-06 PROCEDURE — 80053 COMPREHEN METABOLIC PANEL: CPT

## 2025-03-20 ENCOUNTER — TELEPHONE (OUTPATIENT)
Dept: PHARMACY | Facility: CLINIC | Age: 75
End: 2025-03-20
Payer: MEDICARE

## 2025-03-20 NOTE — TELEPHONE ENCOUNTER
[facility] Chart Review & Patient Outreach Details For Medication Adherence Project    Reason for Outreach Encounter: 3rd Party payor non-compliance report ([employer], [employer], C, etc)  2.  Patient Outreach Method: Reviewed Patient Chart  3.   Medication in question: Atorvastatin   LAST FILLED:2/19/25 for 30 day supply  Hyperlipidemia Medications              atorvastatin (LIPITOR) 40 MG tablet Take 40 mg by mouth every evening.               4.  Reviewed and or Updates Made To: Patient Chart  5. Outreach Outcomes and/or actions taken: Patient does not have mychart, will call pt to remind him to fill.

## 2025-04-11 ENCOUNTER — TELEPHONE (OUTPATIENT)
Dept: PHARMACY | Facility: CLINIC | Age: 75
End: 2025-04-11
Payer: MEDICARE

## 2025-04-11 NOTE — TELEPHONE ENCOUNTER
[facility] Chart Review & Patient Outreach Details For Medication Adherence Project    Reason for Outreach Encounter: 3rd Party payor non-compliance report ([employer], [employer], C, etc)  2.  Patient Outreach Method: Reviewed Patient Chart  3.   Medication in question: atorvastatin   LAST FILLED: 3/25/25 for 30 day supply  Hyperlipidemia Medications              atorvastatin (LIPITOR) 40 MG tablet Take 40 mg by mouth every evening.               4.  Reviewed and or Updates Made To: Patient Chart  5. Outreach Outcomes and/or actions taken: Patient filled medication and is on track to be adherent

## 2025-05-18 ENCOUNTER — TELEPHONE (OUTPATIENT)
Dept: PHARMACY | Facility: CLINIC | Age: 75
End: 2025-05-18
Payer: MEDICARE

## 2025-05-18 NOTE — TELEPHONE ENCOUNTER
[facility] Chart Review & Patient Outreach Details For Medication Adherence Project    Reason for Outreach Encounter: 3rd Party payor non-compliance report ([employer], [employer], [facility], etc)  2.  Patient Outreach Method: Chargeback[employer] message  3.   Medication in question: ATORVASTATIN   LAST FILLED: 4/20/25 for 30 day supply  Hyperlipidemia Medications              atorvastatin (LIPITOR) 40 MG tablet Take 40 mg by mouth every evening.               4.  Reviewed and or Updates Made To: Patient Chart  5. Outreach Outcomes and/or actions taken: Sent inquiry to patient: Waiting for response; pt declines refill

## 2025-06-17 ENCOUNTER — TELEPHONE (OUTPATIENT)
Dept: PHARMACY | Facility: CLINIC | Age: 75
End: 2025-06-17
Payer: MEDICARE

## 2025-06-17 NOTE — TELEPHONE ENCOUNTER
[facility] Chart Review & Patient Outreach Details For Medication Adherence Project    Reason for Outreach Encounter: 3rd Party payor non-compliance report ([employer], [employer], C, etc)  2.  Patient Outreach Method: Reviewed Patient Chart  3.   Medication in question: atorvastatin   LAST FILLED: 5/23/25 for 30 day supply  Hyperlipidemia Medications              atorvastatin (LIPITOR) 40 MG tablet Take 40 mg by mouth every evening.               4.  Reviewed and or Updates Made To: Patient Chart  5. Outreach Outcomes and/or actions taken: Patient filled medication and is on track to be adherent

## 2025-06-23 ENCOUNTER — LAB VISIT (OUTPATIENT)
Dept: LAB | Facility: HOSPITAL | Age: 75
End: 2025-06-23
Attending: SURGERY
Payer: MEDICARE

## 2025-06-23 DIAGNOSIS — Z12.11 SPECIAL SCREENING FOR MALIGNANT NEOPLASMS, COLON: Primary | ICD-10-CM

## 2025-06-23 LAB
HEMOCCULT SP1 STL QL: NEGATIVE
HEMOCCULT SP2 STL QL: NEGATIVE
HEMOCCULT SP3 STL QL: NEGATIVE

## 2025-06-23 PROCEDURE — 82270 OCCULT BLOOD FECES: CPT

## 2025-06-23 PROCEDURE — 83993 ASSAY FOR CALPROTECTIN FECAL: CPT

## 2025-06-25 LAB — CALPROTECTIN STL-MCNT: 102 MCG/G

## 2025-07-11 ENCOUNTER — LAB VISIT (OUTPATIENT)
Dept: LAB | Facility: HOSPITAL | Age: 75
End: 2025-07-11
Attending: NURSE PRACTITIONER
Payer: MEDICARE

## 2025-07-11 DIAGNOSIS — I10 ESSENTIAL HYPERTENSION, MALIGNANT: ICD-10-CM

## 2025-07-11 DIAGNOSIS — E78.5 HYPERLIPIDEMIA, UNSPECIFIED HYPERLIPIDEMIA TYPE: Primary | ICD-10-CM

## 2025-07-11 LAB
ALBUMIN SERPL-MCNC: 4.1 G/DL (ref 3.4–4.8)
ALBUMIN/GLOB SERPL: 1.4 RATIO (ref 1.1–2)
ALP SERPL-CCNC: 44 UNIT/L (ref 40–150)
ALT SERPL-CCNC: 17 UNIT/L (ref 0–55)
ANION GAP SERPL CALC-SCNC: 7 MEQ/L
AST SERPL-CCNC: 15 UNIT/L (ref 11–45)
BILIRUB SERPL-MCNC: 0.5 MG/DL
BUN SERPL-MCNC: 16 MG/DL (ref 8.4–25.7)
CALCIUM SERPL-MCNC: 9.2 MG/DL (ref 8.8–10)
CHLORIDE SERPL-SCNC: 106 MMOL/L (ref 98–107)
CO2 SERPL-SCNC: 29 MMOL/L (ref 23–31)
CREAT SERPL-MCNC: 0.83 MG/DL (ref 0.72–1.25)
CREAT/UREA NIT SERPL: 19
ERYTHROCYTE [DISTWIDTH] IN BLOOD BY AUTOMATED COUNT: 13.1 % (ref 11.5–17)
GFR SERPLBLD CREATININE-BSD FMLA CKD-EPI: >60 ML/MIN/1.73/M2
GLOBULIN SER-MCNC: 3 GM/DL (ref 2.4–3.5)
GLUCOSE SERPL-MCNC: 93 MG/DL (ref 82–115)
HCT VFR BLD AUTO: 40.1 % (ref 42–52)
HGB BLD-MCNC: 13.1 G/DL (ref 14–18)
MCH RBC QN AUTO: 32.6 PG (ref 27–31)
MCHC RBC AUTO-ENTMCNC: 32.7 G/DL (ref 33–36)
MCV RBC AUTO: 99.8 FL (ref 80–94)
PLATELET # BLD AUTO: 218 X10(3)/MCL (ref 130–400)
PMV BLD AUTO: 11 FL (ref 7.4–10.4)
POTASSIUM SERPL-SCNC: 4.4 MMOL/L (ref 3.5–5.1)
PROT SERPL-MCNC: 7.1 GM/DL (ref 5.8–7.6)
RBC # BLD AUTO: 4.02 X10(6)/MCL (ref 4.7–6.1)
SODIUM SERPL-SCNC: 142 MMOL/L (ref 136–145)
WBC # BLD AUTO: 7.61 X10(3)/MCL (ref 4.5–11.5)

## 2025-07-11 PROCEDURE — 36415 COLL VENOUS BLD VENIPUNCTURE: CPT

## 2025-07-11 PROCEDURE — 85027 COMPLETE CBC AUTOMATED: CPT

## 2025-07-11 PROCEDURE — 80053 COMPREHEN METABOLIC PANEL: CPT

## 2025-07-17 ENCOUNTER — TELEPHONE (OUTPATIENT)
Dept: PHARMACY | Facility: CLINIC | Age: 75
End: 2025-07-17
Payer: MEDICARE

## 2025-07-17 NOTE — TELEPHONE ENCOUNTER
[facility] Chart Review & Patient Outreach Details For Medication Adherence Project    Reason for Outreach Encounter: 3rd Party payor non-compliance report ([employer], [employer], C, etc)  2.  Patient Outreach Method: Reviewed Patient Chart  3.   Medication in question: atorvastatin   LAST FILLED: 6/27/25 for 30 day supply  Hyperlipidemia Medications              atorvastatin (LIPITOR) 40 MG tablet Take 40 mg by mouth every evening.               4.  Reviewed and or Updates Made To: Patient Chart  5. Outreach Outcomes and/or actions taken: Patient filled medication and is on track to be adherent

## 2025-08-06 ENCOUNTER — LAB VISIT (OUTPATIENT)
Dept: LAB | Facility: HOSPITAL | Age: 75
End: 2025-08-06
Attending: FAMILY MEDICINE
Payer: MEDICARE

## 2025-08-06 DIAGNOSIS — R53.83 FATIGUE, UNSPECIFIED TYPE: ICD-10-CM

## 2025-08-06 DIAGNOSIS — Z13.1 SCREENING FOR DIABETES MELLITUS: ICD-10-CM

## 2025-08-06 DIAGNOSIS — E78.5 HYPERLIPIDEMIA, UNSPECIFIED HYPERLIPIDEMIA TYPE: Primary | ICD-10-CM

## 2025-08-06 LAB
ALBUMIN SERPL-MCNC: 4 G/DL (ref 3.4–4.8)
ALBUMIN/GLOB SERPL: 1.3 RATIO (ref 1.1–2)
ALP SERPL-CCNC: 44 UNIT/L (ref 40–150)
ALT SERPL-CCNC: 14 UNIT/L (ref 0–55)
ANION GAP SERPL CALC-SCNC: 6 MEQ/L
AST SERPL-CCNC: 15 UNIT/L (ref 11–45)
BILIRUB SERPL-MCNC: 0.8 MG/DL
BUN SERPL-MCNC: 11.6 MG/DL (ref 8.4–25.7)
CALCIUM SERPL-MCNC: 8.9 MG/DL (ref 8.8–10)
CHLORIDE SERPL-SCNC: 107 MMOL/L (ref 98–107)
CO2 SERPL-SCNC: 30 MMOL/L (ref 23–31)
CREAT SERPL-MCNC: 0.82 MG/DL (ref 0.72–1.25)
CREAT/UREA NIT SERPL: 14
EST. AVERAGE GLUCOSE BLD GHB EST-MCNC: 105.4 MG/DL
GFR SERPLBLD CREATININE-BSD FMLA CKD-EPI: >60 ML/MIN/1.73/M2
GLOBULIN SER-MCNC: 3 GM/DL (ref 2.4–3.5)
GLUCOSE SERPL-MCNC: 101 MG/DL (ref 82–115)
HBA1C MFR BLD: 5.3 %
POTASSIUM SERPL-SCNC: 4.3 MMOL/L (ref 3.5–5.1)
PROT SERPL-MCNC: 7 GM/DL (ref 5.8–7.6)
SODIUM SERPL-SCNC: 143 MMOL/L (ref 136–145)
TSH SERPL-ACNC: 2.04 UIU/ML (ref 0.35–4.94)

## 2025-08-06 PROCEDURE — 36415 COLL VENOUS BLD VENIPUNCTURE: CPT

## 2025-08-06 PROCEDURE — 80053 COMPREHEN METABOLIC PANEL: CPT

## 2025-08-06 PROCEDURE — 83036 HEMOGLOBIN GLYCOSYLATED A1C: CPT

## 2025-08-06 PROCEDURE — 84443 ASSAY THYROID STIM HORMONE: CPT

## (undated) DEVICE — PAD ELECTROSURGICAL SPL W/CORD

## (undated) DEVICE — TRAP SUCTION POLYP

## (undated) DEVICE — KIT SURGICAL COLON .25 1.1OZ

## (undated) DEVICE — SNARE POLYP STD SNG 7FR